# Patient Record
Sex: MALE | Race: WHITE | Employment: OTHER | URBAN - METROPOLITAN AREA
[De-identification: names, ages, dates, MRNs, and addresses within clinical notes are randomized per-mention and may not be internally consistent; named-entity substitution may affect disease eponyms.]

---

## 2017-03-24 RX ORDER — ESCITALOPRAM OXALATE 5 MG/1
10 TABLET ORAL DAILY
COMMUNITY
End: 2017-06-08 | Stop reason: DRUGHIGH

## 2017-03-24 RX ORDER — LORAZEPAM 0.5 MG/1
0.5 TABLET ORAL EVERY 4 HOURS PRN
COMMUNITY

## 2017-03-25 ENCOUNTER — HOSPITAL ENCOUNTER (OUTPATIENT)
Facility: HOSPITAL | Age: 57
Discharge: HOME | End: 2017-03-27
Attending: SURGERY | Admitting: SURGERY
Payer: MEDICARE

## 2017-03-25 ENCOUNTER — ANESTHESIA (OUTPATIENT)
Dept: SURGERY | Facility: HOSPITAL | Age: 57
End: 2017-03-25
Payer: MEDICARE

## 2017-03-25 ENCOUNTER — SURGERY (OUTPATIENT)
Age: 57
End: 2017-03-25

## 2017-03-25 ENCOUNTER — ANESTHESIA EVENT (OUTPATIENT)
Dept: SURGERY | Facility: HOSPITAL | Age: 57
End: 2017-03-25
Payer: MEDICARE

## 2017-03-25 DIAGNOSIS — T82.858A AV FISTULA STENOSIS, INITIAL ENCOUNTER (HCC): ICD-10-CM

## 2017-03-25 DIAGNOSIS — T82.898A HEMODIALYSIS AV FISTULA ANEURYSM, INITIAL ENCOUNTER (HCC): Primary | ICD-10-CM

## 2017-03-25 LAB
ANION GAP SERPL CALC-SCNC: 12 MMOL/L (ref 0–18)
ANTIBODY SCREEN: NEGATIVE
BASOPHILS # BLD: 0 K/UL (ref 0–0.2)
BASOPHILS NFR BLD: 0 %
BUN SERPL-MCNC: 45 MG/DL (ref 8–20)
BUN/CREAT SERPL: 7.4 (ref 10–20)
CALCIUM SERPL-MCNC: 9.2 MG/DL (ref 8.5–10.5)
CHLORIDE SERPL-SCNC: 94 MMOL/L (ref 95–110)
CO2 SERPL-SCNC: 27 MMOL/L (ref 22–32)
CREAT SERPL-MCNC: 6.06 MG/DL (ref 0.5–1.5)
EOSINOPHIL # BLD: 0 K/UL (ref 0–0.7)
EOSINOPHIL NFR BLD: 0 %
ERYTHROCYTE [DISTWIDTH] IN BLOOD BY AUTOMATED COUNT: 17.2 % (ref 11–15)
GLUCOSE BLDC GLUCOMTR-MCNC: 106 MG/DL (ref 70–99)
GLUCOSE BLDC GLUCOMTR-MCNC: 153 MG/DL (ref 70–99)
GLUCOSE BLDC GLUCOMTR-MCNC: 172 MG/DL (ref 70–99)
GLUCOSE BLDC GLUCOMTR-MCNC: 227 MG/DL (ref 70–99)
GLUCOSE SERPL-MCNC: 253 MG/DL (ref 70–99)
HCT VFR BLD AUTO: 30.3 % (ref 41–52)
HGB BLD-MCNC: 10.2 G/DL (ref 13.5–17.5)
LYMPHOCYTES # BLD: 0.5 K/UL (ref 1–4)
LYMPHOCYTES NFR BLD: 5 %
MCH RBC QN AUTO: 31.9 PG (ref 27–32)
MCHC RBC AUTO-ENTMCNC: 33.6 G/DL (ref 32–37)
MCV RBC AUTO: 94.8 FL (ref 80–100)
MONOCYTES # BLD: 0.2 K/UL (ref 0–1)
MONOCYTES NFR BLD: 2 %
NEUTROPHILS # BLD AUTO: 8.3 K/UL (ref 1.8–7.7)
NEUTROPHILS NFR BLD: 92 %
OSMOLALITY UR CALC.SUM OF ELEC: 296 MOSM/KG (ref 275–295)
PLATELET # BLD AUTO: 149 K/UL (ref 140–400)
PMV BLD AUTO: 7.8 FL (ref 7.4–10.3)
POTASSIUM SERPL-SCNC: 4.4 MMOL/L (ref 3.3–5.1)
POTASSIUM SERPL-SCNC: 5.6 MMOL/L (ref 3.3–5.1)
RBC # BLD AUTO: 3.2 M/UL (ref 4.5–5.9)
RH BLOOD TYPE: POSITIVE
SODIUM SERPL-SCNC: 133 MMOL/L (ref 136–144)
WBC # BLD AUTO: 9 K/UL (ref 4–11)

## 2017-03-25 PROCEDURE — 051F09Y BYPASS LEFT CEPHALIC VEIN TO UPPER VEIN WITH AUTOLOGOUS VENOUS TISSUE, OPEN APPROACH: ICD-10-PCS | Performed by: SURGERY

## 2017-03-25 PROCEDURE — 99219 INITIAL OBSERVATION CARE,LEVL II: CPT | Performed by: HOSPITALIST

## 2017-03-25 PROCEDURE — 05QF0ZZ REPAIR LEFT CEPHALIC VEIN, OPEN APPROACH: ICD-10-PCS | Performed by: SURGERY

## 2017-03-25 RX ORDER — NALOXONE HYDROCHLORIDE 0.4 MG/ML
80 INJECTION, SOLUTION INTRAMUSCULAR; INTRAVENOUS; SUBCUTANEOUS AS NEEDED
Status: DISCONTINUED | OUTPATIENT
Start: 2017-03-25 | End: 2017-03-25 | Stop reason: HOSPADM

## 2017-03-25 RX ORDER — DEXAMETHASONE SODIUM PHOSPHATE 4 MG/ML
VIAL (ML) INJECTION AS NEEDED
Status: DISCONTINUED | OUTPATIENT
Start: 2017-03-25 | End: 2017-03-25 | Stop reason: SURG

## 2017-03-25 RX ORDER — MORPHINE SULFATE 2 MG/ML
2 INJECTION, SOLUTION INTRAMUSCULAR; INTRAVENOUS EVERY 2 HOUR PRN
Status: DISCONTINUED | OUTPATIENT
Start: 2017-03-25 | End: 2017-03-27

## 2017-03-25 RX ORDER — HYDROMORPHONE HYDROCHLORIDE 1 MG/ML
0.2 INJECTION, SOLUTION INTRAMUSCULAR; INTRAVENOUS; SUBCUTANEOUS EVERY 5 MIN PRN
Status: DISCONTINUED | OUTPATIENT
Start: 2017-03-25 | End: 2017-03-25 | Stop reason: HOSPADM

## 2017-03-25 RX ORDER — ONDANSETRON 2 MG/ML
4 INJECTION INTRAMUSCULAR; INTRAVENOUS EVERY 6 HOURS PRN
Status: DISCONTINUED | OUTPATIENT
Start: 2017-03-25 | End: 2017-03-27

## 2017-03-25 RX ORDER — LORAZEPAM 0.5 MG/1
0.5 TABLET ORAL EVERY 4 HOURS PRN
Status: DISCONTINUED | OUTPATIENT
Start: 2017-03-25 | End: 2017-03-27

## 2017-03-25 RX ORDER — SODIUM PHOSPHATE, DIBASIC AND SODIUM PHOSPHATE, MONOBASIC 7; 19 G/133ML; G/133ML
1 ENEMA RECTAL ONCE AS NEEDED
Status: ACTIVE | OUTPATIENT
Start: 2017-03-25 | End: 2017-03-25

## 2017-03-25 RX ORDER — MORPHINE SULFATE 2 MG/ML
1 INJECTION, SOLUTION INTRAMUSCULAR; INTRAVENOUS EVERY 2 HOUR PRN
Status: DISCONTINUED | OUTPATIENT
Start: 2017-03-25 | End: 2017-03-27

## 2017-03-25 RX ORDER — SODIUM CHLORIDE 9 MG/ML
INJECTION, SOLUTION INTRAVENOUS CONTINUOUS PRN
Status: DISCONTINUED | OUTPATIENT
Start: 2017-03-25 | End: 2017-03-25 | Stop reason: SURG

## 2017-03-25 RX ORDER — METOPROLOL SUCCINATE 25 MG/1
25 TABLET, EXTENDED RELEASE ORAL 2 TIMES DAILY
Status: DISCONTINUED | OUTPATIENT
Start: 2017-03-25 | End: 2017-03-27

## 2017-03-25 RX ORDER — ATORVASTATIN CALCIUM 10 MG/1
10 TABLET, FILM COATED ORAL NIGHTLY
Status: DISCONTINUED | OUTPATIENT
Start: 2017-03-25 | End: 2017-03-27

## 2017-03-25 RX ORDER — MORPHINE SULFATE 4 MG/ML
4 INJECTION, SOLUTION INTRAMUSCULAR; INTRAVENOUS EVERY 2 HOUR PRN
Status: DISCONTINUED | OUTPATIENT
Start: 2017-03-25 | End: 2017-03-27

## 2017-03-25 RX ORDER — TEMAZEPAM 30 MG/1
30 CAPSULE ORAL NIGHTLY
Status: DISCONTINUED | OUTPATIENT
Start: 2017-03-25 | End: 2017-03-27

## 2017-03-25 RX ORDER — DEXTROSE MONOHYDRATE 25 G/50ML
50 INJECTION, SOLUTION INTRAVENOUS AS NEEDED
Status: DISCONTINUED | OUTPATIENT
Start: 2017-03-25 | End: 2017-03-27

## 2017-03-25 RX ORDER — ONDANSETRON 2 MG/ML
4 INJECTION INTRAMUSCULAR; INTRAVENOUS ONCE AS NEEDED
Status: DISCONTINUED | OUTPATIENT
Start: 2017-03-25 | End: 2017-03-25 | Stop reason: HOSPADM

## 2017-03-25 RX ORDER — ASPIRIN 81 MG/1
81 TABLET ORAL DAILY
Status: DISCONTINUED | OUTPATIENT
Start: 2017-03-26 | End: 2017-03-27

## 2017-03-25 RX ORDER — HYDROCODONE BITARTRATE AND ACETAMINOPHEN 10; 325 MG/1; MG/1
1 TABLET ORAL EVERY 4 HOURS PRN
Status: DISCONTINUED | OUTPATIENT
Start: 2017-03-25 | End: 2017-03-27

## 2017-03-25 RX ORDER — POLYETHYLENE GLYCOL 3350 17 G/17G
17 POWDER, FOR SOLUTION ORAL DAILY PRN
Status: DISCONTINUED | OUTPATIENT
Start: 2017-03-25 | End: 2017-03-27

## 2017-03-25 RX ORDER — SODIUM CHLORIDE, SODIUM LACTATE, POTASSIUM CHLORIDE, CALCIUM CHLORIDE 600; 310; 30; 20 MG/100ML; MG/100ML; MG/100ML; MG/100ML
INJECTION, SOLUTION INTRAVENOUS CONTINUOUS
Status: DISCONTINUED | OUTPATIENT
Start: 2017-03-25 | End: 2017-03-25

## 2017-03-25 RX ORDER — ACETAMINOPHEN 325 MG/1
650 TABLET ORAL ONCE
Status: DISCONTINUED | OUTPATIENT
Start: 2017-03-25 | End: 2017-03-25 | Stop reason: HOSPADM

## 2017-03-25 RX ORDER — MORPHINE SULFATE 10 MG/ML
6 INJECTION, SOLUTION INTRAMUSCULAR; INTRAVENOUS EVERY 10 MIN PRN
Status: DISCONTINUED | OUTPATIENT
Start: 2017-03-25 | End: 2017-03-25 | Stop reason: HOSPADM

## 2017-03-25 RX ORDER — ZOLPIDEM TARTRATE 5 MG/1
5 TABLET ORAL NIGHTLY PRN
Status: DISCONTINUED | OUTPATIENT
Start: 2017-03-25 | End: 2017-03-27

## 2017-03-25 RX ORDER — HYDROCODONE BITARTRATE AND ACETAMINOPHEN 5; 325 MG/1; MG/1
2 TABLET ORAL AS NEEDED
Status: DISCONTINUED | OUTPATIENT
Start: 2017-03-25 | End: 2017-03-25 | Stop reason: HOSPADM

## 2017-03-25 RX ORDER — DOCUSATE SODIUM 100 MG/1
100 CAPSULE, LIQUID FILLED ORAL 2 TIMES DAILY
Status: DISCONTINUED | OUTPATIENT
Start: 2017-03-25 | End: 2017-03-27

## 2017-03-25 RX ORDER — HYDROCODONE BITARTRATE AND ACETAMINOPHEN 10; 325 MG/1; MG/1
1 TABLET ORAL EVERY 6 HOURS PRN
Qty: 20 TABLET | Refills: 0 | Status: SHIPPED | OUTPATIENT
Start: 2017-03-25 | End: 2017-03-30

## 2017-03-25 RX ORDER — MORPHINE SULFATE 2 MG/ML
2 INJECTION, SOLUTION INTRAMUSCULAR; INTRAVENOUS EVERY 10 MIN PRN
Status: DISCONTINUED | OUTPATIENT
Start: 2017-03-25 | End: 2017-03-25 | Stop reason: HOSPADM

## 2017-03-25 RX ORDER — ESCITALOPRAM OXALATE 10 MG/1
5 TABLET ORAL DAILY
Status: DISCONTINUED | OUTPATIENT
Start: 2017-03-26 | End: 2017-03-27

## 2017-03-25 RX ORDER — 0.9 % SODIUM CHLORIDE 0.9 %
VIAL (ML) INJECTION
Status: COMPLETED
Start: 2017-03-25 | End: 2017-03-25

## 2017-03-25 RX ORDER — ONDANSETRON 2 MG/ML
INJECTION INTRAMUSCULAR; INTRAVENOUS AS NEEDED
Status: DISCONTINUED | OUTPATIENT
Start: 2017-03-25 | End: 2017-03-25 | Stop reason: SURG

## 2017-03-25 RX ORDER — BUPIVACAINE HYDROCHLORIDE 2.5 MG/ML
INJECTION, SOLUTION EPIDURAL; INFILTRATION; INTRACAUDAL AS NEEDED
Status: DISCONTINUED | OUTPATIENT
Start: 2017-03-25 | End: 2017-03-25 | Stop reason: HOSPADM

## 2017-03-25 RX ORDER — HYDROMORPHONE HYDROCHLORIDE 1 MG/ML
0.4 INJECTION, SOLUTION INTRAMUSCULAR; INTRAVENOUS; SUBCUTANEOUS EVERY 5 MIN PRN
Status: DISCONTINUED | OUTPATIENT
Start: 2017-03-25 | End: 2017-03-25 | Stop reason: HOSPADM

## 2017-03-25 RX ORDER — ACETAMINOPHEN 325 MG/1
650 TABLET ORAL EVERY 6 HOURS PRN
Status: DISCONTINUED | OUTPATIENT
Start: 2017-03-25 | End: 2017-03-27

## 2017-03-25 RX ORDER — LIDOCAINE HYDROCHLORIDE 10 MG/ML
INJECTION, SOLUTION EPIDURAL; INFILTRATION; INTRACAUDAL; PERINEURAL AS NEEDED
Status: DISCONTINUED | OUTPATIENT
Start: 2017-03-25 | End: 2017-03-25 | Stop reason: SURG

## 2017-03-25 RX ORDER — FAMOTIDINE 20 MG/1
20 TABLET ORAL ONCE
Status: COMPLETED | OUTPATIENT
Start: 2017-03-25 | End: 2017-03-25

## 2017-03-25 RX ORDER — HEPARIN SODIUM 1000 [USP'U]/ML
INJECTION, SOLUTION INTRAVENOUS; SUBCUTANEOUS AS NEEDED
Status: DISCONTINUED | OUTPATIENT
Start: 2017-03-25 | End: 2017-03-25 | Stop reason: SURG

## 2017-03-25 RX ORDER — HYDROCODONE BITARTRATE AND ACETAMINOPHEN 5; 325 MG/1; MG/1
1 TABLET ORAL AS NEEDED
Status: DISCONTINUED | OUTPATIENT
Start: 2017-03-25 | End: 2017-03-25 | Stop reason: HOSPADM

## 2017-03-25 RX ORDER — MORPHINE SULFATE 4 MG/ML
4 INJECTION, SOLUTION INTRAMUSCULAR; INTRAVENOUS EVERY 10 MIN PRN
Status: DISCONTINUED | OUTPATIENT
Start: 2017-03-25 | End: 2017-03-25 | Stop reason: HOSPADM

## 2017-03-25 RX ORDER — HEPARIN SODIUM 5000 [USP'U]/ML
5000 INJECTION, SOLUTION INTRAVENOUS; SUBCUTANEOUS EVERY 12 HOURS
Status: DISCONTINUED | OUTPATIENT
Start: 2017-03-26 | End: 2017-03-27

## 2017-03-25 RX ORDER — EPHEDRINE SULFATE 50 MG/ML
INJECTION, SOLUTION INTRAVENOUS AS NEEDED
Status: DISCONTINUED | OUTPATIENT
Start: 2017-03-25 | End: 2017-03-25 | Stop reason: SURG

## 2017-03-25 RX ORDER — BISACODYL 10 MG
10 SUPPOSITORY, RECTAL RECTAL
Status: DISCONTINUED | OUTPATIENT
Start: 2017-03-25 | End: 2017-03-27

## 2017-03-25 RX ORDER — SEVELAMER CARBONATE 800 MG/1
800 TABLET, FILM COATED ORAL
Status: DISCONTINUED | OUTPATIENT
Start: 2017-03-25 | End: 2017-03-27

## 2017-03-25 RX ORDER — HYDROMORPHONE HYDROCHLORIDE 1 MG/ML
0.6 INJECTION, SOLUTION INTRAMUSCULAR; INTRAVENOUS; SUBCUTANEOUS EVERY 5 MIN PRN
Status: DISCONTINUED | OUTPATIENT
Start: 2017-03-25 | End: 2017-03-25 | Stop reason: HOSPADM

## 2017-03-25 RX ADMIN — ONDANSETRON 4 MG: 2 INJECTION INTRAMUSCULAR; INTRAVENOUS at 12:27:00

## 2017-03-25 RX ADMIN — LIDOCAINE HYDROCHLORIDE 25 MG: 10 INJECTION, SOLUTION EPIDURAL; INFILTRATION; INTRACAUDAL; PERINEURAL at 12:13:00

## 2017-03-25 RX ADMIN — EPHEDRINE SULFATE 5 MG: 50 INJECTION, SOLUTION INTRAVENOUS at 12:27:00

## 2017-03-25 RX ADMIN — SODIUM CHLORIDE: 9 INJECTION, SOLUTION INTRAVENOUS at 12:40:00

## 2017-03-25 RX ADMIN — SODIUM CHLORIDE: 9 INJECTION, SOLUTION INTRAVENOUS at 14:40:00

## 2017-03-25 RX ADMIN — SODIUM CHLORIDE: 9 INJECTION, SOLUTION INTRAVENOUS at 12:07:00

## 2017-03-25 RX ADMIN — SODIUM CHLORIDE: 9 INJECTION, SOLUTION INTRAVENOUS at 15:05:00

## 2017-03-25 RX ADMIN — HEPARIN SODIUM 3000 UNITS: 1000 INJECTION, SOLUTION INTRAVENOUS; SUBCUTANEOUS at 12:55:00

## 2017-03-25 RX ADMIN — EPHEDRINE SULFATE 5 MG: 50 INJECTION, SOLUTION INTRAVENOUS at 12:32:00

## 2017-03-25 RX ADMIN — HEPARIN SODIUM 1000 UNITS: 1000 INJECTION, SOLUTION INTRAVENOUS; SUBCUTANEOUS at 14:12:00

## 2017-03-25 RX ADMIN — EPHEDRINE SULFATE 5 MG: 50 INJECTION, SOLUTION INTRAVENOUS at 12:31:00

## 2017-03-25 RX ADMIN — DEXAMETHASONE SODIUM PHOSPHATE 4 MG: 4 MG/ML VIAL (ML) INJECTION at 12:27:00

## 2017-03-25 RX ADMIN — EPHEDRINE SULFATE 10 MG: 50 INJECTION, SOLUTION INTRAVENOUS at 12:21:00

## 2017-03-25 NOTE — ANESTHESIA POSTPROCEDURE EVALUATION
Patient: Bella Nichols    Procedure Summary     Date Anesthesia Start Anesthesia Stop Room / Location    03/25/17 1208 1526 300 [facility] MAIN OR 02 / 300 [facility] MAIN OR       Procedure Diagnosis Surgeon Responsible Provider    A.V. FISTULA (Left ) (End stage renal di

## 2017-03-25 NOTE — BRIEF OP NOTE
[address] UNIT  Brief Op Note       Patients Name: Percy Smith  Attending Physician: Lv Clemons MD  Operating Physician: Leeann Manriquez MD  CSN: [id number]     Location:  OR  MRN: [id number]    YOB: 1960

## 2017-03-25 NOTE — ANESTHESIA PREPROCEDURE EVALUATION
Anesthesia PreOp Note    HPI:     Sctot Leary is a 60year old male who presents for preoperative consultation requested by: Marcus Santos MD    Date of Surgery: 3/25/2017    Procedure(s):  A.V. FISTULA  Indication: End stage renal disease with com per day, may take three times on dialysis days Disp:  Rfl:  3/24/2017 at 1000   RENVELA 800 MG Oral Tab Take 800 mg by mouth 4 (four) times daily with meals and nightly.    Disp:  Rfl:  3/24/2017 at 1900   Metoprolol Succinate ER 25 MG Oral Tablet 24 Hr Regulo Current Outpatient Prescriptions Ordered in Epic:  HYDROcodone-acetaminophen (NORCO)  MG Oral Tab Take 1 tablet by mouth every 6 (six) hours as needed for Pain.  Disp: 20 tablet Rfl: 0       No Known Allergies    Family History   Problem Relation Devin Del Rosario  of the nature of the anesthetic plan, benefits, risks, major complications, and any alternative forms of anesthetic management. All of the patient's questions were answered to the best of my ability.  The patient desires the anesthetic

## 2017-03-26 LAB
ANION GAP SERPL CALC-SCNC: 13 MMOL/L (ref 0–18)
BUN SERPL-MCNC: 57 MG/DL (ref 8–20)
BUN/CREAT SERPL: 8.4 (ref 10–20)
CALCIUM SERPL-MCNC: 9.2 MG/DL (ref 8.5–10.5)
CHLORIDE SERPL-SCNC: 93 MMOL/L (ref 95–110)
CO2 SERPL-SCNC: 26 MMOL/L (ref 22–32)
CREAT SERPL-MCNC: 6.77 MG/DL (ref 0.5–1.5)
GLUCOSE BLDC GLUCOMTR-MCNC: 152 MG/DL (ref 70–99)
GLUCOSE BLDC GLUCOMTR-MCNC: 159 MG/DL (ref 70–99)
GLUCOSE BLDC GLUCOMTR-MCNC: 168 MG/DL (ref 70–99)
GLUCOSE BLDC GLUCOMTR-MCNC: 208 MG/DL (ref 70–99)
GLUCOSE SERPL-MCNC: 165 MG/DL (ref 70–99)
HBA1C MFR BLD: 6.6 % (ref 4–6)
MAGNESIUM SERPL-MCNC: 2.6 MG/DL (ref 1.8–2.5)
OSMOLALITY UR CALC.SUM OF ELEC: 294 MOSM/KG (ref 275–295)
POTASSIUM SERPL-SCNC: 5.4 MMOL/L (ref 3.3–5.1)
SODIUM SERPL-SCNC: 132 MMOL/L (ref 136–144)

## 2017-03-26 PROCEDURE — 99226 SUBSEQUENT OBSERVATION CARE: CPT | Performed by: HOSPITALIST

## 2017-03-26 RX ORDER — HEPARIN SODIUM 5000 [USP'U]/ML
5000 INJECTION, SOLUTION INTRAVENOUS; SUBCUTANEOUS EVERY 12 HOURS SCHEDULED
Status: DISCONTINUED | OUTPATIENT
Start: 2017-03-26 | End: 2017-03-27

## 2017-03-26 RX ORDER — 0.9 % SODIUM CHLORIDE 0.9 %
VIAL (ML) INJECTION
Status: COMPLETED
Start: 2017-03-26 | End: 2017-03-26

## 2017-03-26 NOTE — PROGRESS NOTES
[facility]    Progress Note    Frutoso Gadaniela Patient Status:  Outpatient in a Bed    1960 MRN [id number]   Location [facility]5W Attending Wilder Saleem MD   Hosp Day # 1 PCP Jerrell Huntley       Subjective:   Complain    4.      Coronary artery disease, status post multiple stents. 5.      Peripheral vascular disease, status post multiple amputations.   6.      Heart arrhythmias.  The patient was hospitalized last year, August 2016, seen by Dr. Tressa Essex for narrow-comple

## 2017-03-26 NOTE — H&P
[facility]    PATIENT'S NAME: Mahsa Lamar   ATTENDING PHYSICIAN: Guadalupe Carbajal MD   PATIENT ACCOUNT#:   [id number]    LOCATION:  [place] [address] RECORD #:   [id number]       YOB: 1960  ADMISSION DATE:       03/2 Had a normal bowel movement yesterday. Others systems reviewed, and negative except as above. PHYSICAL EXAMINATION:    GENERAL:  The patient is very pleasant, cooperative. He has wife at bedside.   Not acute distress, a little uncomfortable due to p brought to sinus rhythm; sustained. The patient reports no problems since then. 9.   Continue home medications. Monitor on telemetry closely. Recheck electrolytes. 10.   Chronic insomnia.   The patient says that he takes both Restoril and Ambien every

## 2017-03-26 NOTE — OPERATIVE REPORT
[facility]    PATIENT'S NAME: Ish Abdi   ATTENDING PHYSICIAN: Inga Guevara MD   OPERATING PHYSICIAN: Teddy Calles MD   PATIENT ACCOUNT#:   [id number]    LOCATION:  [address] [place] [address] Road RECORD #:   [id number]       DATE OF BIR brought to the operating room, and placed in the supine position. General anesthesia was given. The patient's entire left upper extremity, axilla, and chest were prepped and draped in the usual sterile manner. A time-out was done.   An incision was made the AV fistula aneurysm with standard technique and 6-0 Prolene suture.   It should be noted that when I had seen the patient in the office I discussed with her that there may be a venous outflow stenosis at the mid left upper arm; it should be noted I coul

## 2017-03-27 VITALS
TEMPERATURE: 98 F | OXYGEN SATURATION: 100 % | RESPIRATION RATE: 18 BRPM | DIASTOLIC BLOOD PRESSURE: 44 MMHG | BODY MASS INDEX: 29.94 KG/M2 | HEIGHT: 68 IN | WEIGHT: 197.56 LBS | HEART RATE: 65 BPM | SYSTOLIC BLOOD PRESSURE: 98 MMHG

## 2017-03-27 LAB
ALBUMIN SERPL BCP-MCNC: 3.1 G/DL (ref 3.5–4.8)
ANION GAP SERPL CALC-SCNC: 13 MMOL/L (ref 0–18)
BASOPHILS # BLD: 0 K/UL (ref 0–0.2)
BASOPHILS NFR BLD: 1 %
BUN SERPL-MCNC: 76 MG/DL (ref 8–20)
BUN/CREAT SERPL: 8.9 (ref 10–20)
CALCIUM SERPL-MCNC: 9.2 MG/DL (ref 8.5–10.5)
CHLORIDE SERPL-SCNC: 92 MMOL/L (ref 95–110)
CO2 SERPL-SCNC: 29 MMOL/L (ref 22–32)
CREAT SERPL-MCNC: 8.52 MG/DL (ref 0.5–1.5)
EOSINOPHIL # BLD: 0.1 K/UL (ref 0–0.7)
EOSINOPHIL NFR BLD: 2 %
ERYTHROCYTE [DISTWIDTH] IN BLOOD BY AUTOMATED COUNT: 17.7 % (ref 11–15)
GLUCOSE BLDC GLUCOMTR-MCNC: 100 MG/DL (ref 70–99)
GLUCOSE SERPL-MCNC: 129 MG/DL (ref 70–99)
HCT VFR BLD AUTO: 27 % (ref 41–52)
HGB BLD-MCNC: 9 G/DL (ref 13.5–17.5)
HGB BLD-MCNC: 9.3 G/DL (ref 13.5–17.5)
LYMPHOCYTES # BLD: 1.2 K/UL (ref 1–4)
LYMPHOCYTES NFR BLD: 17 %
MCH RBC QN AUTO: 31.7 PG (ref 27–32)
MCHC RBC AUTO-ENTMCNC: 33.2 G/DL (ref 32–37)
MCV RBC AUTO: 95.3 FL (ref 80–100)
MONOCYTES # BLD: 0.9 K/UL (ref 0–1)
MONOCYTES NFR BLD: 12 %
NEUTROPHILS # BLD AUTO: 4.8 K/UL (ref 1.8–7.7)
NEUTROPHILS NFR BLD: 69 %
OSMOLALITY UR CALC.SUM OF ELEC: 302 MOSM/KG (ref 275–295)
PHOSPHATE SERPL-MCNC: 7.2 MG/DL (ref 2.4–4.7)
PLATELET # BLD AUTO: 131 K/UL (ref 140–400)
PMV BLD AUTO: 8.7 FL (ref 7.4–10.3)
POTASSIUM SERPL-SCNC: 4.9 MMOL/L (ref 3.3–5.1)
RBC # BLD AUTO: 2.83 M/UL (ref 4.5–5.9)
SODIUM SERPL-SCNC: 134 MMOL/L (ref 136–144)
WBC # BLD AUTO: 7 K/UL (ref 4–11)

## 2017-03-27 PROCEDURE — 99217 OBSERVATION CARE DISCHARGE: CPT | Performed by: HOSPITALIST

## 2017-03-27 NOTE — PROGRESS NOTES
Discussed discharge plans with patient at bedside. Pt to be d/c today and go to HD in outpatient clinic. Discussed discharge meds, follow up, and instructions. Including dressing and wound care instructions. All questions answered.   Patient d/c home

## 2017-03-27 NOTE — DISCHARGE PLANNING
[facility] following up on d/c planning for the patient. Per report he will be d/c'd today and will go directly to his dialysis clinic. [facility] called [address] to update them. He did miss his chair time today, but they will be able to fit him in for dialysis today.

## 2017-03-27 NOTE — PLAN OF CARE
Patient/Family Goals    • Patient/Family Long Term Goal Adequate for Discharge    • Patient/Family Short Term Goal Adequate for Discharge        Discharged directly to outpatient dialysis center with own wheelchair and special braces and shoes.   Pt applied

## 2017-03-29 ENCOUNTER — TELEPHONE (OUTPATIENT)
Dept: CARDIOLOGY UNIT | Facility: HOSPITAL | Age: 57
End: 2017-03-29

## 2017-03-29 NOTE — DISCHARGE SUMMARY
[facility] HOSPITALIST  DISCHARGE SUMMARY     Cassie Calvo Patient Status:  Outpatient in a Bed    1960 MRN [id number]   Location [facility] Attending No att. providers found   [address] Day # 2 PCP Prem Benitez     Date of Admission: 3/25/201 disease, difficult access.  The patient underwent left arm AV fistula revision by Dr. Kristin Tao on 3/25  Admitted for monitoring of his arrhythmia, pain control  -Pain control.  We will use IV morphine p.r.n. in addition to regular Norco 10 that the patient phan tablet by mouth every 6 (six) hours as needed for Pain.     Stop taking on:  3/30/2017   Quantity:  20 tablet   Refills:  0       HYDROcodone-acetaminophen  MG Tabs   Last time this was given:  1 tablet on 3/27/2017 12:02 PM   Commonly known as:  [employer] Bisulfate        Take 75 mg by mouth daily. Refills:  0       RENVELA 800 MG Tabs   Last time this was given:  800 mg on 3/27/2017  8:32 AM   Generic drug:  Sevelamer Carbonate        Take 800 mg by mouth 4 (four) times daily with meals and nightly. for discharge care transition.     Lace+ Score: 5  59-90 High Risk  29-58 Medium Risk  0-28 Low Risk    Risk of readmission: Steven Suazo has Low Risk of readmission after discharge from the hospital.        Karli Rahman MD 3/29/2017    Time sp

## 2017-06-06 ENCOUNTER — TELEPHONE (OUTPATIENT)
Dept: SURGERY | Facility: CLINIC | Age: 57
End: 2017-06-06

## 2017-06-06 NOTE — TELEPHONE ENCOUNTER
Returned pt's wife phone call. She said she had received message from our office that her  has an appointment with our office for 6/8/17 at 1100. She is concerned his L hand has an infection again. States it is swollen, no erythema or drainage.

## 2017-06-06 NOTE — TELEPHONE ENCOUNTER
Pt’s wife called to request to speak to a nurse regarding her ’s swollen L hand. I have booked pt appt for 6/8/17@ 11am. Please call to advice wife what to do till Thursday’s appt. Thank you.

## 2017-06-08 ENCOUNTER — OFFICE VISIT (OUTPATIENT)
Dept: SURGERY | Facility: CLINIC | Age: 57
End: 2017-06-08

## 2017-06-08 DIAGNOSIS — S61.002A OPEN WOUND OF LEFT THUMB, INITIAL ENCOUNTER: Primary | ICD-10-CM

## 2017-06-08 PROCEDURE — 99214 OFFICE O/P EST MOD 30 MIN: CPT | Performed by: PLASTIC SURGERY

## 2017-06-08 PROCEDURE — G0463 HOSPITAL OUTPT CLINIC VISIT: HCPCS | Performed by: PLASTIC SURGERY

## 2017-06-08 RX ORDER — CEFADROXIL 500 MG/1
500 CAPSULE ORAL 2 TIMES DAILY
Qty: 14 CAPSULE | Refills: 0 | Status: SHIPPED | OUTPATIENT
Start: 2017-06-08 | End: 2017-06-15

## 2017-06-08 RX ORDER — ESCITALOPRAM OXALATE 10 MG/1
10 TABLET ORAL DAILY
COMMUNITY
Start: 2017-06-06 | End: 2018-02-08

## 2017-06-08 NOTE — PROGRESS NOTES
Ariel Singer is a 60year old male that presents with Patient presents with:  Wound: Multiple open wounds to bilateral hands and fingers, pt is a , hx finger amputations.   Sees psych for behavioral therapy, currently washing with soap and water RENVELA 800 MG Oral Tab Take 800 mg by mouth 4 (four) times daily with meals and nightly. Disp:  Rfl:    Metoprolol Succinate ER 25 MG Oral Tablet 24 Hr Take 25 mg by mouth 2 (two) times daily.  Disp:  Rfl:    insulin glargine (LANTUS) 100 UNIT/ML Subcu FOOT,TRANSMETATARSAL Bilateral     CATH BARE METAL STENT (BMS)      Comment x2    AMPUTATION FINGER/THUMB Left     Comment thumb, 1st, and 2nd digits    AMPUTATION FINGER/THUMB Right     Comment index finger    CATARACT Bilateral     Comment at age 37

## 2017-06-21 ENCOUNTER — TELEPHONE (OUTPATIENT)
Dept: SURGERY | Facility: CLINIC | Age: 57
End: 2017-06-21

## 2017-06-21 NOTE — TELEPHONE ENCOUNTER
Pt's spouse called to cancel pt's appt for tomorrow 60/22/17 (FU RH INFECTION). Per spouse pt is feeling much better and appt is no longer needed. Thank You.

## 2017-07-20 RX ORDER — CEFADROXIL 500 MG/1
CAPSULE ORAL
Qty: 14 CAPSULE | Refills: 0 | OUTPATIENT
Start: 2017-07-20

## 2017-07-24 ENCOUNTER — APPOINTMENT (OUTPATIENT)
Dept: GENERAL RADIOLOGY | Facility: HOSPITAL | Age: 57
DRG: 291 | End: 2017-07-24
Attending: EMERGENCY MEDICINE
Payer: MEDICARE

## 2017-07-24 ENCOUNTER — HOSPITAL ENCOUNTER (INPATIENT)
Facility: HOSPITAL | Age: 57
LOS: 4 days | Discharge: HOME | DRG: 291 | End: 2017-07-29
Attending: EMERGENCY MEDICINE | Admitting: INTERNAL MEDICINE
Payer: MEDICARE

## 2017-07-24 ENCOUNTER — APPOINTMENT (OUTPATIENT)
Dept: CT IMAGING | Facility: HOSPITAL | Age: 57
DRG: 291 | End: 2017-07-24
Attending: EMERGENCY MEDICINE
Payer: MEDICARE

## 2017-07-24 DIAGNOSIS — R77.8 ELEVATED TROPONIN: ICD-10-CM

## 2017-07-24 DIAGNOSIS — S00.93XA CONTUSION OF HEAD, UNSPECIFIED PART OF HEAD, INITIAL ENCOUNTER: ICD-10-CM

## 2017-07-24 DIAGNOSIS — I50.9 ACUTE ON CHRONIC CONGESTIVE HEART FAILURE, UNSPECIFIED CONGESTIVE HEART FAILURE TYPE: ICD-10-CM

## 2017-07-24 DIAGNOSIS — R29.6 RECURRENT FALLS: Primary | ICD-10-CM

## 2017-07-24 LAB
ALBUMIN SERPL BCP-MCNC: 2.5 G/DL (ref 3.5–4.8)
ALBUMIN/GLOB SERPL: 0.6 {RATIO} (ref 1–2)
ALP SERPL-CCNC: 95 U/L (ref 32–100)
ALT SERPL-CCNC: 15 U/L (ref 17–63)
ANION GAP SERPL CALC-SCNC: 9 MMOL/L (ref 0–18)
AST SERPL-CCNC: 26 U/L (ref 15–41)
BASOPHILS # BLD: 0.1 K/UL (ref 0–0.2)
BASOPHILS NFR BLD: 2 %
BILIRUB SERPL-MCNC: 1 MG/DL (ref 0.3–1.2)
BNP SERPL-MCNC: 1225 PG/ML (ref 0–100)
BUN SERPL-MCNC: 17 MG/DL (ref 8–20)
BUN/CREAT SERPL: 4.5 (ref 10–20)
CALCIUM SERPL-MCNC: 9.3 MG/DL (ref 8.5–10.5)
CHLORIDE SERPL-SCNC: 95 MMOL/L (ref 95–110)
CO2 SERPL-SCNC: 33 MMOL/L (ref 22–32)
CREAT SERPL-MCNC: 3.79 MG/DL (ref 0.5–1.5)
EOSINOPHIL # BLD: 0.2 K/UL (ref 0–0.7)
EOSINOPHIL NFR BLD: 2 %
ERYTHROCYTE [DISTWIDTH] IN BLOOD BY AUTOMATED COUNT: 19.8 % (ref 11–15)
GLOBULIN PLAS-MCNC: 4.4 G/DL (ref 2.5–3.7)
GLUCOSE SERPL-MCNC: 137 MG/DL (ref 70–99)
HCT VFR BLD AUTO: 31.9 % (ref 41–52)
HGB BLD-MCNC: 10.1 G/DL (ref 13.5–17.5)
INR BLD: 1.9 (ref 0.9–1.2)
LYMPHOCYTES # BLD: 0.8 K/UL (ref 1–4)
LYMPHOCYTES NFR BLD: 9 %
MCH RBC QN AUTO: 30.1 PG (ref 27–32)
MCHC RBC AUTO-ENTMCNC: 31.6 G/DL (ref 32–37)
MCV RBC AUTO: 95.2 FL (ref 80–100)
MONOCYTES # BLD: 1 K/UL (ref 0–1)
MONOCYTES NFR BLD: 12 %
NEUTROPHILS # BLD AUTO: 6.1 K/UL (ref 1.8–7.7)
NEUTROPHILS NFR BLD: 75 %
OSMOLALITY UR CALC.SUM OF ELEC: 288 MOSM/KG (ref 275–295)
PLATELET # BLD AUTO: 339 K/UL (ref 140–400)
PMV BLD AUTO: 7.9 FL (ref 7.4–10.3)
POTASSIUM SERPL-SCNC: 3.3 MMOL/L (ref 3.3–5.1)
PROT SERPL-MCNC: 6.9 G/DL (ref 5.9–8.4)
PROTHROMBIN TIME: 21.1 SECONDS (ref 11.8–14.5)
RBC # BLD AUTO: 3.35 M/UL (ref 4.5–5.9)
SODIUM SERPL-SCNC: 137 MMOL/L (ref 136–144)
TROPONIN I SERPL-MCNC: 0.04 NG/ML (ref ?–0.03)
WBC # BLD AUTO: 8.2 K/UL (ref 4–11)

## 2017-07-24 PROCEDURE — 70450 CT HEAD/BRAIN W/O DYE: CPT | Performed by: EMERGENCY MEDICINE

## 2017-07-24 PROCEDURE — 71010 XR CHEST AP PORTABLE  (CPT=71010): CPT | Performed by: EMERGENCY MEDICINE

## 2017-07-24 RX ORDER — ASPIRIN 81 MG/1
324 TABLET, CHEWABLE ORAL ONCE
Status: COMPLETED | OUTPATIENT
Start: 2017-07-24 | End: 2017-07-25

## 2017-07-24 RX ORDER — FUROSEMIDE 10 MG/ML
40 INJECTION INTRAMUSCULAR; INTRAVENOUS ONCE
Status: COMPLETED | OUTPATIENT
Start: 2017-07-24 | End: 2017-07-25

## 2017-07-25 ENCOUNTER — APPOINTMENT (OUTPATIENT)
Dept: CV DIAGNOSTICS | Facility: HOSPITAL | Age: 57
DRG: 291 | End: 2017-07-25
Attending: INTERNAL MEDICINE
Payer: MEDICARE

## 2017-07-25 PROBLEM — I50.9 ACUTE ON CHRONIC CONGESTIVE HEART FAILURE, UNSPECIFIED CONGESTIVE HEART FAILURE TYPE: Status: ACTIVE | Noted: 2017-07-25

## 2017-07-25 PROBLEM — R79.89 ELEVATED TROPONIN: Status: ACTIVE | Noted: 2017-07-25

## 2017-07-25 PROBLEM — S00.93XA CONTUSION OF HEAD, UNSPECIFIED PART OF HEAD, INITIAL ENCOUNTER: Status: ACTIVE | Noted: 2017-07-25

## 2017-07-25 PROBLEM — R77.8 ELEVATED TROPONIN: Status: ACTIVE | Noted: 2017-07-25

## 2017-07-25 LAB
ALBUMIN SERPL BCP-MCNC: 2.2 G/DL (ref 3.5–4.8)
ALBUMIN/GLOB SERPL: 0.6 {RATIO} (ref 1–2)
ALP SERPL-CCNC: 80 U/L (ref 32–100)
ALT SERPL-CCNC: 14 U/L (ref 17–63)
ANION GAP SERPL CALC-SCNC: 7 MMOL/L (ref 0–18)
AST SERPL-CCNC: 20 U/L (ref 15–41)
BASOPHILS # BLD: 0.1 K/UL (ref 0–0.2)
BASOPHILS NFR BLD: 1 %
BILIRUB SERPL-MCNC: 1.1 MG/DL (ref 0.3–1.2)
BUN SERPL-MCNC: 17 MG/DL (ref 8–20)
BUN/CREAT SERPL: 4 (ref 10–20)
CALCIUM SERPL-MCNC: 8.8 MG/DL (ref 8.5–10.5)
CHLORIDE SERPL-SCNC: 97 MMOL/L (ref 95–110)
CHOLEST SERPL-MCNC: 53 MG/DL (ref 110–200)
CHOLEST SERPL-MCNC: 62 MG/DL (ref 110–200)
CO2 SERPL-SCNC: 32 MMOL/L (ref 22–32)
CREAT SERPL-MCNC: 4.25 MG/DL (ref 0.5–1.5)
EOSINOPHIL # BLD: 0.3 K/UL (ref 0–0.7)
EOSINOPHIL NFR BLD: 4 %
ERYTHROCYTE [DISTWIDTH] IN BLOOD BY AUTOMATED COUNT: 19.1 % (ref 11–15)
GLOBULIN PLAS-MCNC: 3.8 G/DL (ref 2.5–3.7)
GLUCOSE BLDC GLUCOMTR-MCNC: 116 MG/DL (ref 70–99)
GLUCOSE BLDC GLUCOMTR-MCNC: 133 MG/DL (ref 70–99)
GLUCOSE BLDC GLUCOMTR-MCNC: 146 MG/DL (ref 70–99)
GLUCOSE BLDC GLUCOMTR-MCNC: 86 MG/DL (ref 70–99)
GLUCOSE BLDC GLUCOMTR-MCNC: 97 MG/DL (ref 70–99)
GLUCOSE SERPL-MCNC: 91 MG/DL (ref 70–99)
HBA1C MFR BLD: 5.3 % (ref 4–6)
HCT VFR BLD AUTO: 28.4 % (ref 41–52)
HDLC SERPL-MCNC: 13 MG/DL
HDLC SERPL-MCNC: 16 MG/DL
HGB BLD-MCNC: 9.1 G/DL (ref 13.5–17.5)
INR BLD: 1.9 (ref 0.9–1.2)
LDLC SERPL CALC-MCNC: 26 MG/DL (ref 0–99)
LDLC SERPL CALC-MCNC: 31 MG/DL (ref 0–99)
LYMPHOCYTES # BLD: 0.9 K/UL (ref 1–4)
LYMPHOCYTES NFR BLD: 14 %
MAGNESIUM SERPL-MCNC: 2 MG/DL (ref 1.8–2.5)
MCH RBC QN AUTO: 30.4 PG (ref 27–32)
MCHC RBC AUTO-ENTMCNC: 32.2 G/DL (ref 32–37)
MCV RBC AUTO: 94.4 FL (ref 80–100)
MONOCYTES # BLD: 1 K/UL (ref 0–1)
MONOCYTES NFR BLD: 16 %
NEUTROPHILS # BLD AUTO: 4.1 K/UL (ref 1.8–7.7)
NEUTROPHILS NFR BLD: 65 %
NONHDLC SERPL-MCNC: 40 MG/DL
NONHDLC SERPL-MCNC: 46 MG/DL
OSMOLALITY UR CALC.SUM OF ELEC: 283 MOSM/KG (ref 275–295)
PLATELET # BLD AUTO: 305 K/UL (ref 140–400)
PMV BLD AUTO: 7.3 FL (ref 7.4–10.3)
POTASSIUM SERPL-SCNC: 3.1 MMOL/L (ref 3.3–5.1)
PROT SERPL-MCNC: 6 G/DL (ref 5.9–8.4)
PROTHROMBIN TIME: 21.2 SECONDS (ref 11.8–14.5)
RBC # BLD AUTO: 3 M/UL (ref 4.5–5.9)
SODIUM SERPL-SCNC: 136 MMOL/L (ref 136–144)
TRIGL SERPL-MCNC: 69 MG/DL (ref 1–149)
TRIGL SERPL-MCNC: 75 MG/DL (ref 1–149)
TROPONIN I SERPL-MCNC: 0.04 NG/ML (ref ?–0.03)
TSH SERPL-ACNC: 2.34 UIU/ML (ref 0.45–5.33)
WBC # BLD AUTO: 6.4 K/UL (ref 4–11)

## 2017-07-25 PROCEDURE — 93306 TTE W/DOPPLER COMPLETE: CPT | Performed by: INTERNAL MEDICINE

## 2017-07-25 PROCEDURE — 99223 1ST HOSP IP/OBS HIGH 75: CPT | Performed by: HOSPITALIST

## 2017-07-25 RX ORDER — DOCUSATE SODIUM 100 MG/1
100 CAPSULE, LIQUID FILLED ORAL DAILY
Status: DISCONTINUED | OUTPATIENT
Start: 2017-07-25 | End: 2017-07-29

## 2017-07-25 RX ORDER — CLOPIDOGREL BISULFATE 75 MG/1
75 TABLET ORAL DAILY
Status: DISCONTINUED | OUTPATIENT
Start: 2017-07-25 | End: 2017-07-29

## 2017-07-25 RX ORDER — SEVELAMER CARBONATE 800 MG/1
800 TABLET, FILM COATED ORAL
Status: DISCONTINUED | OUTPATIENT
Start: 2017-07-25 | End: 2017-07-29

## 2017-07-25 RX ORDER — ATORVASTATIN CALCIUM 10 MG/1
10 TABLET, FILM COATED ORAL NIGHTLY
Status: DISCONTINUED | OUTPATIENT
Start: 2017-07-25 | End: 2017-07-29

## 2017-07-25 RX ORDER — POTASSIUM CHLORIDE 20 MEQ/1
40 TABLET, EXTENDED RELEASE ORAL DAILY
Status: DISCONTINUED | OUTPATIENT
Start: 2017-07-25 | End: 2017-07-29

## 2017-07-25 RX ORDER — DEXTROSE MONOHYDRATE 25 G/50ML
50 INJECTION, SOLUTION INTRAVENOUS AS NEEDED
Status: DISCONTINUED | OUTPATIENT
Start: 2017-07-25 | End: 2017-07-29

## 2017-07-25 RX ORDER — ALBUMIN (HUMAN) 12.5 G/50ML
100 SOLUTION INTRAVENOUS AS NEEDED
Status: DISCONTINUED | OUTPATIENT
Start: 2017-07-26 | End: 2017-07-27

## 2017-07-25 RX ORDER — HYDROCODONE BITARTRATE AND ACETAMINOPHEN 10; 325 MG/1; MG/1
2 TABLET ORAL EVERY 4 HOURS PRN
Status: DISCONTINUED | OUTPATIENT
Start: 2017-07-25 | End: 2017-07-29

## 2017-07-25 RX ORDER — HEPARIN SODIUM 5000 [USP'U]/ML
5000 INJECTION, SOLUTION INTRAVENOUS; SUBCUTANEOUS EVERY 12 HOURS SCHEDULED
Status: DISCONTINUED | OUTPATIENT
Start: 2017-07-25 | End: 2017-07-29

## 2017-07-25 RX ORDER — ASPIRIN 81 MG/1
81 TABLET, CHEWABLE ORAL DAILY
Status: DISCONTINUED | OUTPATIENT
Start: 2017-07-25 | End: 2017-07-29

## 2017-07-25 RX ORDER — ZOLPIDEM TARTRATE 5 MG/1
5 TABLET ORAL NIGHTLY PRN
Status: DISCONTINUED | OUTPATIENT
Start: 2017-07-25 | End: 2017-07-29

## 2017-07-25 RX ORDER — ESCITALOPRAM OXALATE 10 MG/1
10 TABLET ORAL EVERY EVENING
Status: DISCONTINUED | OUTPATIENT
Start: 2017-07-25 | End: 2017-07-29

## 2017-07-25 RX ORDER — LORAZEPAM 0.5 MG/1
0.5 TABLET ORAL EVERY 4 HOURS PRN
Status: DISCONTINUED | OUTPATIENT
Start: 2017-07-25 | End: 2017-07-29

## 2017-07-25 RX ORDER — ZOLPIDEM TARTRATE 5 MG/1
5 TABLET ORAL NIGHTLY PRN
COMMUNITY
End: 2018-01-18

## 2017-07-25 RX ORDER — SODIUM CHLORIDE 0.9 % (FLUSH) 0.9 %
3 SYRINGE (ML) INJECTION AS NEEDED
Status: DISCONTINUED | OUTPATIENT
Start: 2017-07-25 | End: 2017-07-29

## 2017-07-25 RX ORDER — METOPROLOL SUCCINATE 25 MG/1
25 TABLET, EXTENDED RELEASE ORAL 2 TIMES DAILY
Status: DISCONTINUED | OUTPATIENT
Start: 2017-07-25 | End: 2017-07-29

## 2017-07-25 NOTE — WOUND PROGRESS NOTE
WOUND CARE NOTE      PLAN   Recommendations:  May consider hand surgery evaluation  Glucose control to help promote wound healing    Wound(s)  Location: Bilateral palms, bilateral fingers 1-5  Cleansing  Wound Cleanser  Topical Silvadene  Dressings 2x2's 07/25/2017   ALT 14 (L) 07/25/2017   MG 2.0 07/25/2017   PHOS 7.2 (H) 03/27/2017

## 2017-07-25 NOTE — H&P
[facility]    History & Physical    Elfreda Phenes Patient Status:  Inpatient    1960 MRN [id number]   Location [facility] 3W/SW Attending Hansel Lorenz MD   Hosp Day # 0 CARITO Sparks     Date:  2017  Date of Admi BARE METAL STENT (BMS)      Comment: x2  No date: OTHER SURGICAL HISTORY      Comment: right index finger amputation  No date: OTHER SURGICAL HISTORY      Comment: left brachiocephalic fiscula for dialysis  9-2-16: PART REMV BONE,DISTAL PHALANX Right Strip    escitalopram 10 MG Oral Tab        Review of Systems:   Constitutional: negative  Eyes: negative  Ears, nose, mouth, throat, and face: negative  Respiratory: negative  Cardiovascular: negative  Gastrointestinal: negative  Genitourinary:negative  M 07/25/2017   CL 97 07/25/2017   CO2 32 07/25/2017   GLU 91 07/25/2017   CA 8.8 07/25/2017   ALB 2.2 (L) 07/25/2017   ALKPHO 80 07/25/2017   BILT 1.1 07/25/2017   TP 6.0 07/25/2017   AST 20 07/25/2017   ALT 14 (L) 07/25/2017   INR 1.9 (H) 07/25/2017   TSH 2 12-lead    Result Date: 7/25/2017  ECG Report  Interpretation  --------------------------     Ekg 12-lead    Result Date: 7/24/2017  ECG Report  Interpretation  --------------------------       Assessment/Plan:     Recurrent falls  -secondary to deconditio

## 2017-07-25 NOTE — PROGRESS NOTES
Pharmacy Note: Dietary Supplement Discontinuation Per Policy    OSTEO BI-FLEX JOINT SHIELD TABS 1 tablet has been discontinued on Prudence Santos per policy. This supplement may be restarted upon discharge using the medication reconciliation process.

## 2017-07-25 NOTE — PLAN OF CARE
RISK FOR INFECTION - ADULT    • Absence of fever/infection during anticipated neutropenic period Not Progressing          CARDIOVASCULAR - ADULT    • Maintains optimal cardiac output and hemodynamic stability Progressing    • Absence of cardiac arrhythmias

## 2017-07-25 NOTE — ED PROVIDER NOTES
Patient Seen in: [facility] AND [facility] Emergency Department    History   Patient presents with:  Fall (musculoskeletal, neurologic)    Stated Complaint: Multiple falls    HPI    49-year-old male patient presents brought by his family members complaining of r CATARACT Bilateral      Comment: at age 37  No date: CATH BARE METAL STENT (BMS)      Comment: x2  No date: OTHER SURGICAL HISTORY      Comment: right index finger amputation  No date: OTHER SURGICAL HISTORY      Comment: left brachiocephalic fiscula for d falls  Other systems are as noted in HPI. Constitutional and vital signs reviewed. All other systems reviewed and negative except as noted above. PSFH elements reviewed from today and agreed except as otherwise stated in HPI.     Physical Exam   ED following:     Troponin 0.04 (*)     All other components within normal limits   BNP (B TYPE NATRIUERTIC PEPTIDE) - Abnormal; Notable for the following:     Beta Natriuretic Peptide 1,225 (*)     All other components within normal limits   CBC W/ DIFFERENT

## 2017-07-26 LAB
ANION GAP SERPL CALC-SCNC: 11 MMOL/L (ref 0–18)
BASOPHILS # BLD: 0.1 K/UL (ref 0–0.2)
BASOPHILS NFR BLD: 2 %
BUN SERPL-MCNC: 25 MG/DL (ref 8–20)
BUN/CREAT SERPL: 4.6 (ref 10–20)
CALCIUM SERPL-MCNC: 9 MG/DL (ref 8.5–10.5)
CHLORIDE SERPL-SCNC: 94 MMOL/L (ref 95–110)
CO2 SERPL-SCNC: 29 MMOL/L (ref 22–32)
CREAT SERPL-MCNC: 5.45 MG/DL (ref 0.5–1.5)
EOSINOPHIL # BLD: 0.3 K/UL (ref 0–0.7)
EOSINOPHIL NFR BLD: 5 %
ERYTHROCYTE [DISTWIDTH] IN BLOOD BY AUTOMATED COUNT: 20.3 % (ref 11–15)
GLUCOSE BLDC GLUCOMTR-MCNC: 120 MG/DL (ref 70–99)
GLUCOSE BLDC GLUCOMTR-MCNC: 139 MG/DL (ref 70–99)
GLUCOSE BLDC GLUCOMTR-MCNC: 88 MG/DL (ref 70–99)
GLUCOSE SERPL-MCNC: 91 MG/DL (ref 70–99)
HCT VFR BLD AUTO: 28.7 % (ref 41–52)
HGB BLD-MCNC: 9.2 G/DL (ref 13.5–17.5)
LYMPHOCYTES # BLD: 1.2 K/UL (ref 1–4)
LYMPHOCYTES NFR BLD: 20 %
MCH RBC QN AUTO: 30.5 PG (ref 27–32)
MCHC RBC AUTO-ENTMCNC: 32 G/DL (ref 32–37)
MCV RBC AUTO: 95.2 FL (ref 80–100)
MONOCYTES # BLD: 0.9 K/UL (ref 0–1)
MONOCYTES NFR BLD: 14 %
NEUTROPHILS # BLD AUTO: 3.6 K/UL (ref 1.8–7.7)
NEUTROPHILS NFR BLD: 59 %
OSMOLALITY UR CALC.SUM OF ELEC: 282 MOSM/KG (ref 275–295)
PLATELET # BLD AUTO: 332 K/UL (ref 140–400)
PMV BLD AUTO: 7.4 FL (ref 7.4–10.3)
POTASSIUM SERPL-SCNC: 3.8 MMOL/L (ref 3.3–5.1)
RBC # BLD AUTO: 3.02 M/UL (ref 4.5–5.9)
SODIUM SERPL-SCNC: 134 MMOL/L (ref 136–144)
WBC # BLD AUTO: 6.2 K/UL (ref 4–11)

## 2017-07-26 PROCEDURE — 5A1D60Z PERFORMANCE OF URINARY FILTRATION, MULTIPLE: ICD-10-PCS | Performed by: INTERNAL MEDICINE

## 2017-07-26 PROCEDURE — 99233 SBSQ HOSP IP/OBS HIGH 50: CPT | Performed by: HOSPITALIST

## 2017-07-26 RX ORDER — MORPHINE SULFATE 2 MG/ML
1 INJECTION, SOLUTION INTRAMUSCULAR; INTRAVENOUS ONCE
Status: COMPLETED | OUTPATIENT
Start: 2017-07-26 | End: 2017-07-26

## 2017-07-26 RX ORDER — SODIUM CHLORIDE 9 MG/ML
INJECTION, SOLUTION INTRAVENOUS
Status: DISPENSED
Start: 2017-07-26 | End: 2017-07-27

## 2017-07-26 RX ORDER — CLINDAMYCIN PHOSPHATE 600 MG/50ML
600 INJECTION INTRAVENOUS EVERY 8 HOURS
Status: DISCONTINUED | OUTPATIENT
Start: 2017-07-26 | End: 2017-07-29

## 2017-07-26 NOTE — PROGRESS NOTES
[facility]    Progress Note    Prudence Santos Patient Status:  Inpatient    1960 MRN [id number]   Location [facility] 3W/SW Attending Christiano Espinoza MD   Hosp Day # 1 PCP Radhika Fabian       Subjective:   Prudence Santos although can't exclude areas of old infarction on the right. 4. No left-sided scalp hematoma. Calvarium intact. Tiny subcentimeter calcified meningioma suspected at the left parietal bone/inner table. 5. Intracranial atherosclerosis.          Xr Chest Ap Po Acute on chronic congestive heart failure, unspecified congestive heart failure type ([facility] [address]  -echo noted  - will consult cards tomorrow   - hold diuretics for now        Elevated troponin  - most likely secondary to fluid overload  - follow troponins  - car

## 2017-07-26 NOTE — PHYSICAL THERAPY NOTE
PHYSICAL THERAPY EVALUATION - INPATIENT     Room Number: 204/343-I  Evaluation Date: 7/26/2017  Type of Evaluation: Initial          Reason for Therapy: Mobility Dysfunction and Discharge Planning    PHYSICAL THERAPY ASSESSMENT     Patient is a 60 year o AMPUTATION FINGER/THUMB Right      Comment: index finger  No date: AMPUTATION FOOT,TRANSMETATARSAL Bilateral  No date: CATARACT Bilateral      Comment: at age 37  No date: CATH BARE METAL STENT (BMS)      Comment: x2  No date: OTHER SURGICAL HISTORY      C 41.77%   Standardized Score (AM-PAC Scale): 45.44   CMS Modifier (G-Code): CK    FUNCTIONAL ABILITY STATUS  Gait Assessment   Gait Assistance: Not tested (SPT)    Bed Mobility: SBA    Transfers:SBA    Exercise/Education Provided:  pt ed on use of platform

## 2017-07-26 NOTE — PLAN OF CARE
Pt c/o back pain. Prn Norco 10/235 2 tabs given at 0845. Patient continues c/o ruperto and stated that \"i take 2 norco every 2hours for pain\". Notified to MD. Per order morphINE sulfate 1 mg IVP given at 1221.  This writer informed patient HD scheduled around

## 2017-07-26 NOTE — CONSULTS
[facility]    Report of Consultation    Darya Juneserg Patient Status:  Inpatient    1960 MRN [id number]   Location [facility] 3W/SW Attending Mauricio Esposito MD   Hosp Day # 1 PCP Juan F Bassett     Date of Admission:   PART REMV BONE,DISTAL PHALANX Right      Comment: Ex wound R thumb    Family History  Family History   Problem Relation Age of Onset   • Heart Disorder Father        Social History  Patient Guardian Status:  Not on file.     Other Topics            Concern DOSE).   LORazepam 0.5 MG Oral Tab Take 0.5 mg by mouth every 4 (four) hours as needed for Anxiety.  Take one tablet twice per day, may take three times on dialysis days   Misc Natural Products (OSTEO BI-FLEX JOINT SHIELD) Oral Tab Take 1 tablet by mouth 3 bilaterally  Cardiovascular: S1, S2 normal  Abdominal: soft, non-tender  Extremities: LUE AVF , no LE edema , b/l foot amputation with b/l partail digital amputation     Results:     Laboratory Data:    Lab Results  Component Value Date   WBC 6.2 07/26/201 Hd scheduled today . Next HD 7/28 unless needed earlier     Anemia in ESRD   Hgb < goal ( 10-11 g/dl). Will start KESHAV 3 x week       CHF/ CAD    Discontinue diuretics. Not fluid overloaded clinically .  On Plavix       Recurrent falls / Contusion of head

## 2017-07-27 ENCOUNTER — APPOINTMENT (OUTPATIENT)
Dept: MRI IMAGING | Facility: HOSPITAL | Age: 57
DRG: 291 | End: 2017-07-27
Attending: HOSPITALIST
Payer: MEDICARE

## 2017-07-27 LAB
ANION GAP SERPL CALC-SCNC: 11 MMOL/L (ref 0–18)
BASOPHILS # BLD: 0.1 K/UL (ref 0–0.2)
BASOPHILS NFR BLD: 1 %
BUN SERPL-MCNC: 16 MG/DL (ref 8–20)
BUN/CREAT SERPL: 3.9 (ref 10–20)
CALCIUM SERPL-MCNC: 9.1 MG/DL (ref 8.5–10.5)
CHLORIDE SERPL-SCNC: 94 MMOL/L (ref 95–110)
CO2 SERPL-SCNC: 26 MMOL/L (ref 22–32)
CREAT SERPL-MCNC: 4.15 MG/DL (ref 0.5–1.5)
EOSINOPHIL # BLD: 0.2 K/UL (ref 0–0.7)
EOSINOPHIL NFR BLD: 3 %
ERYTHROCYTE [DISTWIDTH] IN BLOOD BY AUTOMATED COUNT: 19.8 % (ref 11–15)
GLUCOSE BLDC GLUCOMTR-MCNC: 100 MG/DL (ref 70–99)
GLUCOSE BLDC GLUCOMTR-MCNC: 107 MG/DL (ref 70–99)
GLUCOSE BLDC GLUCOMTR-MCNC: 89 MG/DL (ref 70–99)
GLUCOSE BLDC GLUCOMTR-MCNC: 90 MG/DL (ref 70–99)
GLUCOSE SERPL-MCNC: 96 MG/DL (ref 70–99)
HCT VFR BLD AUTO: 30.1 % (ref 41–52)
HGB BLD-MCNC: 9.5 G/DL (ref 13.5–17.5)
LYMPHOCYTES # BLD: 1.1 K/UL (ref 1–4)
LYMPHOCYTES NFR BLD: 18 %
MCH RBC QN AUTO: 30.5 PG (ref 27–32)
MCHC RBC AUTO-ENTMCNC: 31.7 G/DL (ref 32–37)
MCV RBC AUTO: 96.4 FL (ref 80–100)
MONOCYTES # BLD: 0.8 K/UL (ref 0–1)
MONOCYTES NFR BLD: 12 %
NEUTROPHILS # BLD AUTO: 4 K/UL (ref 1.8–7.7)
NEUTROPHILS NFR BLD: 65 %
OSMOLALITY UR CALC.SUM OF ELEC: 273 MOSM/KG (ref 275–295)
PLATELET # BLD AUTO: 330 K/UL (ref 140–400)
PMV BLD AUTO: 7.7 FL (ref 7.4–10.3)
POTASSIUM SERPL-SCNC: 4.2 MMOL/L (ref 3.3–5.1)
RBC # BLD AUTO: 3.12 M/UL (ref 4.5–5.9)
SODIUM SERPL-SCNC: 131 MMOL/L (ref 136–144)
WBC # BLD AUTO: 6.2 K/UL (ref 4–11)

## 2017-07-27 PROCEDURE — 70551 MRI BRAIN STEM W/O DYE: CPT | Performed by: HOSPITALIST

## 2017-07-27 PROCEDURE — 99233 SBSQ HOSP IP/OBS HIGH 50: CPT | Performed by: HOSPITALIST

## 2017-07-27 RX ORDER — ALBUMIN (HUMAN) 12.5 G/50ML
100 SOLUTION INTRAVENOUS AS NEEDED
Status: DISCONTINUED | OUTPATIENT
Start: 2017-07-28 | End: 2017-07-29

## 2017-07-27 RX ORDER — LISINOPRIL 5 MG/1
5 TABLET ORAL DAILY
Status: DISCONTINUED | OUTPATIENT
Start: 2017-07-27 | End: 2017-07-29

## 2017-07-27 NOTE — PLAN OF CARE
Achieve highest/safest level of independence in self care Not Progressing      Achieve highest/safest level of mobility/gait Not Progressing      Free from fall injury Progressing    Pt lives at home with wife, appears to rely on wife for a lot of his care is very anxious for discharge, reinforced that he has to be in stable condition and with all testing completed prior to discharge.   Patient/Family Long Term Goal Progressing      Patient/Family Short Term Goal Progressing    Pt is anxious for discharge to

## 2017-07-27 NOTE — CONSULTS
[facility]    PATIENT'S NAME: Leanne Belen   ATTENDING PHYSICIAN: Burt Mendez MD   CONSULTING PHYSICIAN: Celestino Newton.  Dorinda Angel MD   PATIENT ACCOUNT#:   [id number]    LOCATION:  [address] [place] [address] #:   [id number]       DATE OF BIRTH:  with 2 sons. Wife present at the bedside. He is a disabled . REVIEW OF SYSTEMS:  Otherwise negative. PHYSICAL EXAMINATION:    GENERAL:  Well-developed, well-nourished male in no acute distress. Alert and oriented x3.     VIT MD  d: 07/27/2017 17:50:59  t: 07/27/2017 18:23:29  Gilberto Mims [id number]  [facility]/

## 2017-07-27 NOTE — CONSULTS
PLASTIC SURGERY      Patient well known to me. Please see previous records and consults. PV occlusive disease, renal failure, repeated hand trauma, including \"picking\" at his fingers.     We have treated with topical Silvadene which keeps the wounds i

## 2017-07-27 NOTE — PLAN OF CARE
Called to patient's room by his wife and found pt on the floor, sitting between his bed and his wheelchair. Wife states they were trying to get him to the wheelchair and they thought they had locked it properly but one side wasn't locked.  They both stated

## 2017-07-27 NOTE — DIETARY NOTE
ADULT NUTRITION INITIAL ASSESSMENT    Pt is at moderate nutrition risk. Pt does not meet malnutrition criteria.       RECOMMENDATIONS TO MD:  Recommendations to MD: RD arranged for Oral Nutrition Supplement (ONS) per pt preference to maximize Nutrition taken at home    - Feeding assistance: meal set up and assit pt with meals, encourage PO and supplement intake    - Nutrition education: Pt followed by RD at Renal Dialysis Unit    - Coordination of nutrition care: collaboration with other providers    - D Larry score 12    NUTRITION PRESCRIPTION:  Diet: Renal  Oral Supplements: Nepro  Calories: 2147-6003 calories/day (28-30 calories per kg)  Protein:  grams protein/day (1.4 grams protein per kg adjusted IBW)    MONITOR AND EVALUATE/NUTRITION GOALS:

## 2017-07-27 NOTE — CONSULTS
Cardiology (Consuklt dictated)    Assessment:  1. Mechanical fall. Not likely cardiac in origin    2. Severe PVD    3. Decline in EF. History of PCI in remote past. R/O new disease.       Plan:  Stress test  Add lisinopril, monitor potassium    Thank you

## 2017-07-27 NOTE — PLAN OF CARE
Addendum to prior note:   Patient didn't have prosthetic legs on, and encouraged to have these on during transfers or when out of bed.

## 2017-07-27 NOTE — PHYSICAL THERAPY NOTE
PT tx attempted in p.m. However pt on floor secondary to trying to get back to bed from w/c but forgot to lock wheel chair. Nursing lifting pt back to bed.

## 2017-07-27 NOTE — PROGRESS NOTES
[facility]    Progress Note    Kavita Sandoval Patient Status:  Inpatient    1960 MRN [id number]   Location [facility] [place] 3W/SW Attending Kamini Jacobson MD   Hosp Day # 2 PCP Shashi Sampson       Subjective:   Kavita Sandoval changes although can't exclude areas of old infarction on the right. 4. No left-sided scalp hematoma. Calvarium intact. Tiny subcentimeter calcified meningioma suspected at the left parietal bone/inner table. 5. Intracranial atherosclerosis.          Chris Malcolm MD  07/27/17

## 2017-07-27 NOTE — PHYSICAL THERAPY NOTE
PT treatment attempted for gait training with platform walker but pt found with digits bleeding, RN informed & requests hold PT until dressings applied.

## 2017-07-27 NOTE — CONSULTS
[facility]    PATIENT'S NAME: Yossi Burtonica   ATTENDING PHYSICIAN: Marianna Leonard MD   CONSULTING PHYSICIAN: Denita Le MD   PATIENT ACCOUNT#:   [id number]    LOCATION:  [address] [place] [address] #:   [id number]       DATE OF

## 2017-07-28 ENCOUNTER — APPOINTMENT (OUTPATIENT)
Dept: NUCLEAR MEDICINE | Facility: HOSPITAL | Age: 57
DRG: 291 | End: 2017-07-28
Attending: INTERNAL MEDICINE
Payer: MEDICARE

## 2017-07-28 ENCOUNTER — APPOINTMENT (OUTPATIENT)
Dept: CV DIAGNOSTICS | Facility: HOSPITAL | Age: 57
DRG: 291 | End: 2017-07-28
Attending: INTERNAL MEDICINE
Payer: MEDICARE

## 2017-07-28 LAB
ANION GAP SERPL CALC-SCNC: 12 MMOL/L (ref 0–18)
BASOPHILS # BLD: 0.1 K/UL (ref 0–0.2)
BASOPHILS NFR BLD: 1 %
BUN SERPL-MCNC: 29 MG/DL (ref 8–20)
BUN/CREAT SERPL: 5.4 (ref 10–20)
CALCIUM SERPL-MCNC: 9.2 MG/DL (ref 8.5–10.5)
CHLORIDE SERPL-SCNC: 94 MMOL/L (ref 95–110)
CO2 SERPL-SCNC: 24 MMOL/L (ref 22–32)
CREAT SERPL-MCNC: 5.4 MG/DL (ref 0.5–1.5)
EOSINOPHIL # BLD: 0.1 K/UL (ref 0–0.7)
EOSINOPHIL NFR BLD: 2 %
ERYTHROCYTE [DISTWIDTH] IN BLOOD BY AUTOMATED COUNT: 20.6 % (ref 11–15)
GLUCOSE BLDC GLUCOMTR-MCNC: 121 MG/DL (ref 70–99)
GLUCOSE BLDC GLUCOMTR-MCNC: 146 MG/DL (ref 70–99)
GLUCOSE BLDC GLUCOMTR-MCNC: 68 MG/DL (ref 70–99)
GLUCOSE BLDC GLUCOMTR-MCNC: 90 MG/DL (ref 70–99)
GLUCOSE SERPL-MCNC: 89 MG/DL (ref 70–99)
HCT VFR BLD AUTO: 30 % (ref 41–52)
HGB BLD-MCNC: 9.7 G/DL (ref 13.5–17.5)
LYMPHOCYTES # BLD: 0.9 K/UL (ref 1–4)
LYMPHOCYTES NFR BLD: 13 %
MCH RBC QN AUTO: 30.9 PG (ref 27–32)
MCHC RBC AUTO-ENTMCNC: 32.3 G/DL (ref 32–37)
MCV RBC AUTO: 95.6 FL (ref 80–100)
MONOCYTES # BLD: 0.9 K/UL (ref 0–1)
MONOCYTES NFR BLD: 13 %
NEUTROPHILS # BLD AUTO: 5.1 K/UL (ref 1.8–7.7)
NEUTROPHILS NFR BLD: 71 %
OSMOLALITY UR CALC.SUM OF ELEC: 275 MOSM/KG (ref 275–295)
PLATELET # BLD AUTO: 363 K/UL (ref 140–400)
PMV BLD AUTO: 7.5 FL (ref 7.4–10.3)
POTASSIUM SERPL-SCNC: 4.6 MMOL/L (ref 3.3–5.1)
RBC # BLD AUTO: 3.14 M/UL (ref 4.5–5.9)
SODIUM SERPL-SCNC: 130 MMOL/L (ref 136–144)
WBC # BLD AUTO: 7.1 K/UL (ref 4–11)

## 2017-07-28 PROCEDURE — 93017 CV STRESS TEST TRACING ONLY: CPT | Performed by: INTERNAL MEDICINE

## 2017-07-28 PROCEDURE — 99233 SBSQ HOSP IP/OBS HIGH 50: CPT | Performed by: HOSPITALIST

## 2017-07-28 PROCEDURE — 78452 HT MUSCLE IMAGE SPECT MULT: CPT | Performed by: INTERNAL MEDICINE

## 2017-07-28 RX ORDER — 0.9 % SODIUM CHLORIDE 0.9 %
VIAL (ML) INJECTION
Status: COMPLETED
Start: 2017-07-28 | End: 2017-07-28

## 2017-07-28 RX ORDER — ALBUMIN (HUMAN) 12.5 G/50ML
100 SOLUTION INTRAVENOUS AS NEEDED
Status: DISCONTINUED | OUTPATIENT
Start: 2017-07-31 | End: 2017-07-29

## 2017-07-28 RX ORDER — SODIUM CHLORIDE 9 MG/ML
INJECTION, SOLUTION INTRAVENOUS
Status: DISPENSED
Start: 2017-07-28 | End: 2017-07-29

## 2017-07-28 RX ORDER — SODIUM CHLORIDE 9 MG/ML
INJECTION, SOLUTION INTRAVENOUS
Status: COMPLETED
Start: 2017-07-28 | End: 2017-07-28

## 2017-07-28 NOTE — PHYSICAL THERAPY NOTE
PM second attempt   Pt with dialysis  Not available at this time    Chart review of note ---  Did reveal pt admitted for falls  As well as during acute stay pt with 2 falls seen documentated  One on 7/27 and one early this AM  7/28/17

## 2017-07-28 NOTE — PLAN OF CARE
Problem: SAFETY ADULT - FALL  Goal: Free from fall injury  INTERVENTIONS:  - Assess pt frequently for physical needs  - Identify cognitive and physical deficits and behaviors that affect risk of falls.   - [place] fall precautions as indicated by assessme

## 2017-07-28 NOTE — DISCHARGE PLANNING
7/28/17 CM Discharge planning   Met with pt, resides with wife and son in family home. Pt states that family asissts with care at home. Pt is current with o/p dialysis MWF. Discharge planning discussed, short term rehab and [place] [phone] options provided.   Pt refu

## 2017-07-28 NOTE — PROGRESS NOTES
[facility] AND [facility]  Progress Note    Kavita Sandoval Patient Status:  Inpatient    1960 MRN [id number]   Location [facility] 3W/SW Attending Kamini Jacobson MD   Hosp Day # 3 PCP Shashi Solid     Assessment:    1. Mechanical fall.  Not likely Results  Component Value Date   TROP 0.04 () 07/25/2017   TROP 0.04 () 07/24/2017        Medications:    • lisinopril  5 mg Oral Daily   • epoetin loida  3,000 Units Subcutaneous Once per day on Mon Wed Fri   • clindamycin  600 mg Intravenous Q8H   • as

## 2017-07-28 NOTE — PROGRESS NOTES
[facility]    Progress Note    Esha Conde Patient Status:  Inpatient    1960 MRN [id number]   Location [facility] 3W/SW Attending Briseyda Bose MD   Hosp Day # 3 PCP Sloane Castorena       Subjective:   Esha Conde infarct or other acute finding. 2. Moderate changes of chronic small vessel disease in cerebral white matter are greater than age expected. 3. Chronic lacunar infarcts in bilateral thalami, and cerebellum related to small vessel disease.  4. Tiny remote cor

## 2017-07-28 NOTE — PROGRESS NOTES
[facility]    Progress Note    Melia Good Patient Status:  Inpatient    1960 MRN [id number]   Location [facility] 3W/SW Attending Sharif Garcia MD   Hosp Day # 3 PCP Rannie Jeans       Subjective:   Melia Good in the right parietal lobe.                Assessment and Plan:      Recurrent falls  -secondary to deconditioning from amputation and overall muscle weakness and heart failure  - Echo reveals EF 35% with severe TR  - appreciate cards reccs  - will most lik

## 2017-07-28 NOTE — PHYSICAL THERAPY NOTE
Chart reviewed for follow up treatment session      Pt is not available at this time   Attempt x 1  Pt is at stress test

## 2017-07-29 VITALS
BODY MASS INDEX: 29.33 KG/M2 | TEMPERATURE: 98 F | SYSTOLIC BLOOD PRESSURE: 109 MMHG | OXYGEN SATURATION: 99 % | HEIGHT: 68 IN | RESPIRATION RATE: 18 BRPM | HEART RATE: 88 BPM | DIASTOLIC BLOOD PRESSURE: 66 MMHG | WEIGHT: 193.5 LBS

## 2017-07-29 LAB
ANION GAP SERPL CALC-SCNC: 9 MMOL/L (ref 0–18)
BASOPHILS # BLD: 0.1 K/UL (ref 0–0.2)
BASOPHILS NFR BLD: 1 %
BUN SERPL-MCNC: 15 MG/DL (ref 8–20)
BUN/CREAT SERPL: 4 (ref 10–20)
CALCIUM SERPL-MCNC: 8.9 MG/DL (ref 8.5–10.5)
CHLORIDE SERPL-SCNC: 100 MMOL/L (ref 95–110)
CO2 SERPL-SCNC: 23 MMOL/L (ref 22–32)
CREAT SERPL-MCNC: 3.76 MG/DL (ref 0.5–1.5)
EOSINOPHIL # BLD: 0.2 K/UL (ref 0–0.7)
EOSINOPHIL NFR BLD: 3 %
ERYTHROCYTE [DISTWIDTH] IN BLOOD BY AUTOMATED COUNT: 20.5 % (ref 11–15)
GLUCOSE BLDC GLUCOMTR-MCNC: 95 MG/DL (ref 70–99)
GLUCOSE SERPL-MCNC: 95 MG/DL (ref 70–99)
HCT VFR BLD AUTO: 26.9 % (ref 41–52)
HGB BLD-MCNC: 8.7 G/DL (ref 13.5–17.5)
LYMPHOCYTES # BLD: 0.8 K/UL (ref 1–4)
LYMPHOCYTES NFR BLD: 12 %
MCH RBC QN AUTO: 30.6 PG (ref 27–32)
MCHC RBC AUTO-ENTMCNC: 32.2 G/DL (ref 32–37)
MCV RBC AUTO: 94.9 FL (ref 80–100)
MONOCYTES # BLD: 1 K/UL (ref 0–1)
MONOCYTES NFR BLD: 14 %
NEUTROPHILS # BLD AUTO: 4.7 K/UL (ref 1.8–7.7)
NEUTROPHILS NFR BLD: 70 %
OSMOLALITY UR CALC.SUM OF ELEC: 275 MOSM/KG (ref 275–295)
PLATELET # BLD AUTO: 292 K/UL (ref 140–400)
PMV BLD AUTO: 7.3 FL (ref 7.4–10.3)
POTASSIUM SERPL-SCNC: 3.8 MMOL/L (ref 3.3–5.1)
RBC # BLD AUTO: 2.84 M/UL (ref 4.5–5.9)
SODIUM SERPL-SCNC: 132 MMOL/L (ref 136–144)
WBC # BLD AUTO: 6.7 K/UL (ref 4–11)

## 2017-07-29 PROCEDURE — 99239 HOSP IP/OBS DSCHRG MGMT >30: CPT | Performed by: HOSPITALIST

## 2017-07-29 RX ORDER — LISINOPRIL 5 MG/1
5 TABLET ORAL DAILY
Qty: 30 TABLET | Refills: 0 | Status: SHIPPED | OUTPATIENT
Start: 2017-07-29 | End: 2018-01-12

## 2017-07-29 RX ORDER — CLINDAMYCIN HYDROCHLORIDE 300 MG/1
300 CAPSULE ORAL 3 TIMES DAILY
Qty: 21 CAPSULE | Refills: 0 | Status: SHIPPED | OUTPATIENT
Start: 2017-07-29 | End: 2017-08-05

## 2017-07-29 RX ORDER — LACTOBACILLUS RHAMNOSUS GG 10B CELL
1 CAPSULE ORAL DAILY
Qty: 7 CAPSULE | Refills: 0 | Status: SHIPPED | OUTPATIENT
Start: 2017-07-29 | End: 2017-08-28

## 2017-07-29 NOTE — DISCHARGE SUMMARY
[facility]    Discharge Summary    Fely Cornelius Patient Status:  Inpatient    1960 MRN [id number]   Location [facility] 3W/SW Attending Jessica Herrera MD   [facility] Day # 4 CARITO Campo     Date of Admission: 2017 denies LOC and palpitations when he falls. He claims he just loses his balance. He states his wounds on his hands have worsened and he does complain of increasing swelling and bleeding. Currently he denies nausea and vomiting and fever and chills.      Dis    HTN  - continue home meds      DVT proph-  SCDs no heparin due to bleeding      Full code       Complications: none     Consultants     Provider Role Specialty    Cyndia Harada, MD Consulting Physician  CARDIOLOGY     Rajan Bentley MD Consulting y mouth.    Homeopathic Products ([facility] LEG CRAMPS PAIN RELIEF OR)  Take by mouth.  Take as needed for cramping during hemodialysis     FreeStyle Lancets Does not apply Misc      FREESTYLE LITE TEST In Vitro Strip      escitalopram 10 MG Oral Tab times on dialysis days   Refills:  0     Metoprolol Succinate ER 25 MG Tb24  Commonly known as: Toprol XL      Take 25 mg by mouth 2 (two) times daily. Refills:  0     OSTEO BI-FLEX JOINT SHIELD Tabs      Take 1 tablet by mouth 3 (three) times daily.

## 2017-07-29 NOTE — DISCHARGE PLANNING
SW was informed by [facility] that pt is discharging today.     Joe Sánchez, [address]

## 2017-07-29 NOTE — OCCUPATIONAL THERAPY NOTE
Pt respectfully refused OT evaluation - stating \" I am going home, I have done my transfers, I don't need anything\"  Upon arrival in room, patient was removing bandages from hands, picking scabs and using sheets for the bleeding - RN made aware

## 2017-07-29 NOTE — PROGRESS NOTES
[facility] Heart Cardiology Progress Note      Heike Craven Patient Status:  Inpatient    1960 MRN [id number]   Location [facility] 3W/SW Attending Renetta Sanders MD   Hosp Day # 4 PCP Rich Guadarrama edema, bilat LE stumps well healed  Neuro: no focal deficits  Skin: no rashes or lesions, bruise on left hip, contusion, left side of face.      Scheduled Meds:   • lisinopril  5 mg Oral Daily   • epoetin loida  3,000 Units Subcutaneous Once per day on Mon W 07/27/17   0536  07/28/17   0604  07/29/17   0622   RBC  3.12*  3.14*  2.84*   HGB  9.5*  9.7*  8.7*   HCT  30.1*  30.0*  26.9*   MCV  96.4  95.6  94.9   MCH  30.5  30.9  30.6   MCHC  31.7*  32.3  32.2   RDW  19.8*  20.6*  20.5*   WBC  6.2  7.1  6.7   PLT

## 2017-07-29 NOTE — PHYSICAL THERAPY NOTE
Chart reviewed; Pt cleared by RN to be seen by PT. Pt refuses therapy today stating \"there is nothing new you can teach me\".  Patient to be D/C home today

## 2017-07-30 ENCOUNTER — TELEPHONE (OUTPATIENT)
Dept: MEDSURG UNIT | Facility: HOSPITAL | Age: 57
End: 2017-07-30

## 2017-08-10 ENCOUNTER — TELEPHONE (OUTPATIENT)
Dept: SURGERY | Facility: CLINIC | Age: 57
End: 2017-08-10

## 2017-08-10 ENCOUNTER — OFFICE VISIT (OUTPATIENT)
Dept: SURGERY | Facility: CLINIC | Age: 57
End: 2017-08-10

## 2017-08-10 DIAGNOSIS — S61.002A OPEN WOUND OF LEFT THUMB, INITIAL ENCOUNTER: Primary | ICD-10-CM

## 2017-08-10 PROCEDURE — G0463 HOSPITAL OUTPT CLINIC VISIT: HCPCS | Performed by: PLASTIC SURGERY

## 2017-08-10 PROCEDURE — 99213 OFFICE O/P EST LOW 20 MIN: CPT | Performed by: PLASTIC SURGERY
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
## 2017-08-10 NOTE — TELEPHONE ENCOUNTER
Julieth ARIAS  from [facility] home health called to clarify pt's home wound care in regards to appointment this am.   Per Dr Isabelle Ramirez patient is to wash all the hand wounds daily with soap and water then apply silvadene and gauze.   Verbalized understanding

## 2017-08-25 ENCOUNTER — TELEPHONE (OUTPATIENT)
Dept: SURGERY | Facility: CLINIC | Age: 57
End: 2017-08-25

## 2017-08-25 NOTE — TELEPHONE ENCOUNTER
Onel espinal nurse from [facility] called to inform Dr Brannon Lama that he is discharging the pt today and that he educated the pt and wife on how to change pt's bandages.      I texted the above message to Dr Brannon Lama. around 12:57pm and he immediately ans

## 2017-09-05 ENCOUNTER — HOSPITAL ENCOUNTER (OUTPATIENT)
Dept: INTERVENTIONAL RADIOLOGY/VASCULAR | Facility: HOSPITAL | Age: 57
Discharge: HOME | End: 2017-09-05
Attending: SURGERY | Admitting: SURGERY
Payer: MEDICARE

## 2017-09-05 VITALS — WEIGHT: 198 LBS | BODY MASS INDEX: 30 KG/M2

## 2017-09-05 DIAGNOSIS — N18.6 ESRD (END STAGE RENAL DISEASE) (HCC): ICD-10-CM

## 2017-09-05 PROCEDURE — 36902 INTRO CATH DIALYSIS CIRCUIT: CPT

## 2017-09-05 PROCEDURE — 057F3ZZ DILATION OF LEFT CEPHALIC VEIN, PERCUTANEOUS APPROACH: ICD-10-PCS | Performed by: RADIOLOGY

## 2017-09-05 RX ORDER — LIDOCAINE HYDROCHLORIDE 20 MG/ML
INJECTION, SOLUTION EPIDURAL; INFILTRATION; INTRACAUDAL; PERINEURAL
Status: COMPLETED
Start: 2017-09-05 | End: 2017-09-05

## 2018-01-04 ENCOUNTER — OFFICE VISIT (OUTPATIENT)
Dept: SURGERY | Facility: CLINIC | Age: 58
End: 2018-01-04

## 2018-01-04 DIAGNOSIS — S61.401A UNSPECIFIED OPEN WOUND OF RIGHT HAND, INITIAL ENCOUNTER: Primary | ICD-10-CM

## 2018-01-04 PROCEDURE — 99214 OFFICE O/P EST MOD 30 MIN: CPT | Performed by: PLASTIC SURGERY

## 2018-01-04 PROCEDURE — G0463 HOSPITAL OUTPT CLINIC VISIT: HCPCS | Performed by: PLASTIC SURGERY

## 2018-01-04 RX ORDER — CEPHALEXIN 500 MG/1
500 CAPSULE ORAL 2 TIMES DAILY
Qty: 14 CAPSULE | Refills: 0 | Status: SHIPPED | OUTPATIENT
Start: 2018-01-04

## 2018-01-04 NOTE — H&P
Nicky Saenz is a 59year old male that presents with Patient presents with:  Laceration: New injury s/p fall. Right hand posterior surface of RIF and MCP. Bloody purulent drainage. Wound: follow up multiple open wounds left hand. Kanwal Santos     REFERRED tablet twice per day, may take three times on dialysis days Disp:  Rfl:    Misc Natural Products (OSTEO BI-FLEX JOINT SHIELD) Oral Tab Take 1 tablet by mouth 3 (three) times daily.  Disp:  Rfl:    RENVELA 800 MG Oral Tab Take 800 mg by mouth 4 (four) times digits  No date: AMPUTATION FINGER/THUMB Right      Comment: index finger  No date: AMPUTATION FOOT,TRANSMETATARSAL Bilateral  No date: CATARACT Bilateral      Comment: at age 37  No date: CATH BARE METAL STENT (BMS)      Comment: x2  No date: OTHER SURGIC treat these, as well as the other wounds, with local care. They may not heal.  He may ultimately require skin grafting.     Wash twice daily, Silvadene, gauze, Spandaandrea  Keflex    10 days      1/4/2018  Stone Gibbons MD

## 2018-01-04 NOTE — PROGRESS NOTES
Per verbal order  from Dr Loco Perry  bilateral hands and R forearm wounds cleaned with sterile water then dried. Silvadene applied then dressed with gauze, kerlix and spandage.

## 2018-01-08 ENCOUNTER — HOSPITAL ENCOUNTER (INPATIENT)
Facility: HOSPITAL | Age: 58
LOS: 4 days | Discharge: HOME | DRG: 811 | End: 2018-01-12
Attending: EMERGENCY MEDICINE | Admitting: HOSPITALIST
Payer: MEDICARE

## 2018-01-08 DIAGNOSIS — D64.9 ANEMIA, UNSPECIFIED TYPE: Primary | ICD-10-CM

## 2018-01-08 DIAGNOSIS — R58 BLEEDING: ICD-10-CM

## 2018-01-08 DIAGNOSIS — K92.1 BLOOD IN STOOL: ICD-10-CM

## 2018-01-08 PROBLEM — K92.2 GI BLEED: Status: ACTIVE | Noted: 2018-01-08

## 2018-01-08 LAB
ANION GAP SERPL CALC-SCNC: 8 MMOL/L (ref 0–18)
ANTIBODY SCREEN: NEGATIVE
BUN SERPL-MCNC: 9 MG/DL (ref 8–20)
BUN/CREAT SERPL: 4.1 (ref 10–20)
CALCIUM SERPL-MCNC: 8.7 MG/DL (ref 8.5–10.5)
CHLORIDE SERPL-SCNC: 99 MMOL/L (ref 95–110)
CO2 SERPL-SCNC: 32 MMOL/L (ref 22–32)
CREAT SERPL-MCNC: 2.17 MG/DL (ref 0.5–1.5)
GLUCOSE BLDC GLUCOMTR-MCNC: 106 MG/DL (ref 70–99)
GLUCOSE BLDC GLUCOMTR-MCNC: 158 MG/DL (ref 70–99)
GLUCOSE SERPL-MCNC: 97 MG/DL (ref 70–99)
MRSA DNA SPEC QL NAA+PROBE: NEGATIVE
OSMOLALITY UR CALC.SUM OF ELEC: 287 MOSM/KG (ref 275–295)
POTASSIUM SERPL-SCNC: 3.1 MMOL/L (ref 3.3–5.1)
RH BLOOD TYPE: POSITIVE
SODIUM SERPL-SCNC: 139 MMOL/L (ref 136–144)

## 2018-01-08 PROCEDURE — 99223 1ST HOSP IP/OBS HIGH 75: CPT | Performed by: HOSPITALIST

## 2018-01-08 RX ORDER — ONDANSETRON 2 MG/ML
4 INJECTION INTRAMUSCULAR; INTRAVENOUS ONCE
Status: COMPLETED | OUTPATIENT
Start: 2018-01-08 | End: 2018-01-08

## 2018-01-08 RX ORDER — MORPHINE SULFATE 4 MG/ML
4 INJECTION, SOLUTION INTRAMUSCULAR; INTRAVENOUS EVERY 30 MIN PRN
Status: COMPLETED | OUTPATIENT
Start: 2018-01-08 | End: 2018-01-09

## 2018-01-08 RX ORDER — DEXTROSE MONOHYDRATE 25 G/50ML
50 INJECTION, SOLUTION INTRAVENOUS AS NEEDED
Status: DISCONTINUED | OUTPATIENT
Start: 2018-01-08 | End: 2018-01-12

## 2018-01-08 RX ORDER — ONDANSETRON 2 MG/ML
4 INJECTION INTRAMUSCULAR; INTRAVENOUS EVERY 6 HOURS PRN
Status: DISCONTINUED | OUTPATIENT
Start: 2018-01-08 | End: 2018-01-12

## 2018-01-08 RX ORDER — SODIUM CHLORIDE 9 MG/ML
INJECTION, SOLUTION INTRAVENOUS ONCE
Status: COMPLETED | OUTPATIENT
Start: 2018-01-08 | End: 2018-01-09

## 2018-01-08 RX ORDER — SODIUM CHLORIDE 0.9 % (FLUSH) 0.9 %
10 SYRINGE (ML) INJECTION AS NEEDED
Status: DISCONTINUED | OUTPATIENT
Start: 2018-01-08 | End: 2018-01-12

## 2018-01-08 RX ORDER — ACETAMINOPHEN 325 MG/1
650 TABLET ORAL ONCE
Status: COMPLETED | OUTPATIENT
Start: 2018-01-08 | End: 2018-01-09

## 2018-01-08 RX ORDER — DIPHENHYDRAMINE HCL 25 MG
25 CAPSULE ORAL ONCE
Status: COMPLETED | OUTPATIENT
Start: 2018-01-08 | End: 2018-01-09

## 2018-01-08 RX ORDER — ACETAMINOPHEN 325 MG/1
650 TABLET ORAL EVERY 6 HOURS PRN
Status: DISCONTINUED | OUTPATIENT
Start: 2018-01-08 | End: 2018-01-12

## 2018-01-08 RX ORDER — SODIUM CHLORIDE 0.9 % (FLUSH) 0.9 %
3 SYRINGE (ML) INJECTION AS NEEDED
Status: DISCONTINUED | OUTPATIENT
Start: 2018-01-08 | End: 2018-01-12

## 2018-01-09 LAB
ANION GAP SERPL CALC-SCNC: 10 MMOL/L (ref 0–18)
BASOPHILS # BLD: 0.1 K/UL (ref 0–0.2)
BASOPHILS # BLD: 0.1 K/UL (ref 0–0.2)
BASOPHILS NFR BLD: 2 %
BASOPHILS NFR BLD: 2 %
BUN SERPL-MCNC: 15 MG/DL (ref 8–20)
BUN/CREAT SERPL: 4.9 (ref 10–20)
CALCIUM SERPL-MCNC: 8.6 MG/DL (ref 8.5–10.5)
CHLORIDE SERPL-SCNC: 97 MMOL/L (ref 95–110)
CO2 SERPL-SCNC: 28 MMOL/L (ref 22–32)
CREAT SERPL-MCNC: 3.06 MG/DL (ref 0.5–1.5)
EOSINOPHIL # BLD: 0.1 K/UL (ref 0–0.7)
EOSINOPHIL # BLD: 0.1 K/UL (ref 0–0.7)
EOSINOPHIL NFR BLD: 2 %
EOSINOPHIL NFR BLD: 2 %
ERYTHROCYTE [DISTWIDTH] IN BLOOD BY AUTOMATED COUNT: 20.8 % (ref 11–15)
ERYTHROCYTE [DISTWIDTH] IN BLOOD BY AUTOMATED COUNT: 21.1 % (ref 11–15)
GLUCOSE BLDC GLUCOMTR-MCNC: 103 MG/DL (ref 70–99)
GLUCOSE BLDC GLUCOMTR-MCNC: 123 MG/DL (ref 70–99)
GLUCOSE BLDC GLUCOMTR-MCNC: 142 MG/DL (ref 70–99)
GLUCOSE BLDC GLUCOMTR-MCNC: 156 MG/DL (ref 70–99)
GLUCOSE BLDC GLUCOMTR-MCNC: 77 MG/DL (ref 70–99)
GLUCOSE SERPL-MCNC: 111 MG/DL (ref 70–99)
HBA1C MFR BLD: 5.1 % (ref 4–6)
HCT VFR BLD AUTO: 20.1 % (ref 41–52)
HCT VFR BLD AUTO: 24.6 % (ref 41–52)
HGB BLD-MCNC: 6.3 G/DL (ref 13.5–17.5)
HGB BLD-MCNC: 7.8 G/DL (ref 13.5–17.5)
LYMPHOCYTES # BLD: 0.8 K/UL (ref 1–4)
LYMPHOCYTES # BLD: 0.9 K/UL (ref 1–4)
LYMPHOCYTES NFR BLD: 14 %
LYMPHOCYTES NFR BLD: 15 %
MCH RBC QN AUTO: 30 PG (ref 27–32)
MCH RBC QN AUTO: 30.4 PG (ref 27–32)
MCHC RBC AUTO-ENTMCNC: 31.5 G/DL (ref 32–37)
MCHC RBC AUTO-ENTMCNC: 31.9 G/DL (ref 32–37)
MCV RBC AUTO: 93.9 FL (ref 80–100)
MCV RBC AUTO: 96.4 FL (ref 80–100)
MONOCYTES # BLD: 0.8 K/UL (ref 0–1)
MONOCYTES # BLD: 0.8 K/UL (ref 0–1)
MONOCYTES NFR BLD: 13 %
MONOCYTES NFR BLD: 14 %
NEUTROPHILS # BLD AUTO: 3.9 K/UL (ref 1.8–7.7)
NEUTROPHILS # BLD AUTO: 4.4 K/UL (ref 1.8–7.7)
NEUTROPHILS NFR BLD: 68 %
NEUTROPHILS NFR BLD: 69 %
OSMOLALITY UR CALC.SUM OF ELEC: 282 MOSM/KG (ref 275–295)
PLATELET # BLD AUTO: 196 K/UL (ref 140–400)
PLATELET # BLD AUTO: 213 K/UL (ref 140–400)
PMV BLD AUTO: 8.7 FL (ref 7.4–10.3)
PMV BLD AUTO: 9.1 FL (ref 7.4–10.3)
POTASSIUM SERPL-SCNC: 3.8 MMOL/L (ref 3.3–5.1)
RBC # BLD AUTO: 2.08 M/UL (ref 4.5–5.9)
RBC # BLD AUTO: 2.62 M/UL (ref 4.5–5.9)
SODIUM SERPL-SCNC: 135 MMOL/L (ref 136–144)
WBC # BLD AUTO: 5.7 K/UL (ref 4–11)
WBC # BLD AUTO: 6.3 K/UL (ref 4–11)

## 2018-01-09 PROCEDURE — 30233R1 TRANSFUSION OF NONAUTOLOGOUS PLATELETS INTO PERIPHERAL VEIN, PERCUTANEOUS APPROACH: ICD-10-PCS | Performed by: HOSPITALIST

## 2018-01-09 PROCEDURE — 99233 SBSQ HOSP IP/OBS HIGH 50: CPT | Performed by: HOSPITALIST

## 2018-01-09 PROCEDURE — 99223 1ST HOSP IP/OBS HIGH 75: CPT | Performed by: INTERNAL MEDICINE

## 2018-01-09 RX ORDER — SEVELAMER CARBONATE 800 MG/1
3200 TABLET, FILM COATED ORAL
Status: DISCONTINUED | OUTPATIENT
Start: 2018-01-09 | End: 2018-01-12

## 2018-01-09 RX ORDER — LORAZEPAM 0.5 MG/1
0.5 TABLET ORAL EVERY 4 HOURS PRN
Status: DISCONTINUED | OUTPATIENT
Start: 2018-01-09 | End: 2018-01-12

## 2018-01-09 RX ORDER — SEVELAMER CARBONATE 800 MG/1
800 TABLET, FILM COATED ORAL
Status: DISCONTINUED | OUTPATIENT
Start: 2018-01-09 | End: 2018-01-09

## 2018-01-09 RX ORDER — ESCITALOPRAM OXALATE 10 MG/1
10 TABLET ORAL DAILY
Status: DISCONTINUED | OUTPATIENT
Start: 2018-01-09 | End: 2018-01-12

## 2018-01-09 RX ORDER — METOPROLOL SUCCINATE 25 MG/1
25 TABLET, EXTENDED RELEASE ORAL 2 TIMES DAILY
Status: DISCONTINUED | OUTPATIENT
Start: 2018-01-09 | End: 2018-01-12

## 2018-01-09 RX ORDER — ZOLPIDEM TARTRATE 5 MG/1
5 TABLET ORAL NIGHTLY PRN
Status: DISCONTINUED | OUTPATIENT
Start: 2018-01-09 | End: 2018-01-12

## 2018-01-09 RX ORDER — ALBUMIN (HUMAN) 12.5 G/50ML
100 SOLUTION INTRAVENOUS
Status: DISCONTINUED | OUTPATIENT
Start: 2018-01-10 | End: 2018-01-11 | Stop reason: ALTCHOICE

## 2018-01-09 RX ORDER — ATORVASTATIN CALCIUM 10 MG/1
10 TABLET, FILM COATED ORAL DAILY
Status: DISCONTINUED | OUTPATIENT
Start: 2018-01-09 | End: 2018-01-12

## 2018-01-09 RX ORDER — HYDROCODONE BITARTRATE AND ACETAMINOPHEN 10; 325 MG/1; MG/1
2 TABLET ORAL EVERY 8 HOURS PRN
Status: DISCONTINUED | OUTPATIENT
Start: 2018-01-09 | End: 2018-01-12

## 2018-01-09 NOTE — PROGRESS NOTES
[facility]    Progress Note    Alvin Maria Elena Patient Status:  Inpatient    1960 MRN [id number]   Location [facility] 3W/SW Attending Syeda Bishop MD   Hosp Day # 1 PCP Niru Lomas       Subjective:   Tanisha Herrera 100 mL, Intravenous, PRN Dialysis  •  sevelamer (RENVELA) tab 800 mg, 3,200 mg, Oral, TID CC and HS  •  Pantoprazole Sodium (PROTONIX) 40 mg in Sodium Chloride 0.9 % 10 mL IV push, 40 mg, Intravenous, Daily  •  Normal Saline Flush 0.9 % injection 3 mL, 3 m

## 2018-01-09 NOTE — WOUND PROGRESS NOTE
WOUND CARE NOTE      PLAN   Recommendations:  Dietary consult for recommendations for nutrition to optimize wound healing    Wound(s)  Location: right hand  Cleansing  Wound Cleanser  Topical xeroform  Dressings 2x2  Secure with benito stevens  Frequency 03/27/2017

## 2018-01-09 NOTE — H&P
[facility]    PATIENT'S NAME: Lianet Fagan   ATTENDING PHYSICIAN: Soco March MD   PATIENT ACCOUNT#:   [id number]    LOCATION:  [place] [address]  MEDICAL RECORD #:   [id number]       YOB: 1960  ADMISSION DATE:       01/ digit stumps. No chest pain, no abdominal pain, no diarrhea. Other 12-point review of systems negative. PHYSICAL EXAMINATION:    GENERAL:  Alert and oriented to time, place, and person. Moderate distress.   VITAL SIGNS:  Temperature 97.5, pulse 101,

## 2018-01-09 NOTE — CONSULTS
[facility]    Report of Consultation    Geoff Yocastahelena Patient Status:  Inpatient    1960 MRN [id number]   Location [facility] 3W/SW Attending Chikis Tay MD   Hosp Day # 1 PCP Cecile Oneill     Date of Admission:   Problem Relation Age of Onset   • Heart Disorder Father        Social History  Smoking status: Former Smoker                                                              Packs/day: 1.00      Years: 0.00         Types: Cigarettes     Quit date: 11/23/2014 times on dialysis days   Misc Natural Products (OSTEO BI-FLEX JOINT SHIELD) Oral Tab Take 1 tablet by mouth 3 (three) times daily. RENVELA 800 MG Oral Tab Take 800 mg by mouth 4 (four) times daily with meals and nightly.      Metoprolol Succinate ER 25 MG Abdomen soft, nontender, nondistended, no organomegaly, bowel sounds present          Results:     Laboratory Data:  Recent Labs   Lab  01/08/18   1525  01/09/18   0924   RBC  2.08*  2.62*   HGB  6.3*  7.8*   HCT  20.1*  24.6*   MCV  96.4  93.9   MCH  30.4

## 2018-01-09 NOTE — ED PROVIDER NOTES
Patient Seen in: [facility] AND [facility] Emergency Department    History   Patient presents with:  Abnormal Result (metabolic, cardiac)    Stated Complaint:     HPI    Patient presents on the advice of staff at dialysis. He had low hemoglobin.   This unfortuna times daily. lisinopril 5 MG Oral Tab,  Take 1 tablet (5 mg total) by mouth daily. Zolpidem Tartrate 5 MG Oral Tab,  Take 5 mg by mouth nightly as needed for Sleep (UNSURE OF DOSE). escitalopram 10 MG Oral Tab,  10 mg daily.      LORazepam 0.5 MG Oral nausea, no vomiting      Positive for stated complaint:   Other systems are as noted in HPI. Constitutional and vital signs reviewed. All other systems reviewed and negative except as noted above.     PSFH elements reviewed from today and agreed excep this patient actually been working on his car and has been having chronic bleeding from his fingers. It is certainly possible that the bleeding is been having from his fingers may be enough to cause him to drop.     ED Course     Labs Reviewed   BASIC META GOLD   RAINBOW DRAW LAVENDER TALL (BNP)        MDM     98% Normal  Pulse oximetry     Disposition and Plan     We recommend that you schedule follow up care with a primary care provider within the next three months to obtain basic health screening includin

## 2018-01-09 NOTE — PLAN OF CARE
METABOLIC/FLUID AND ELECTROLYTES - ADULT    • Glucose maintained within prescribed range Progressing    • Electrolytes maintained within normal limits Progressing    • Hemodynamic stability and optimal renal function maintained Progressing        SKIN/TISS

## 2018-01-09 NOTE — CONSULTS
[facility]   Gastroenterology Consultation Note    Violet Ray  Patient Status:    Inpatient  Date of Admission:         1/8/2018, Hospital day #1  Attending:   Brittnee King MD  PCP:     Donovan Lord    Reason for Consultat osteomyelitis of right hand ([facility] 9-2-16    R thumb   • Anemia    • Anxiety state    • Cataract    • Cellulitis of hand, left    • Colon polyps    • Congestive heart disease ([facility]    • Coronary artery disease    • Diabetes [facility])    • Dialysis patient ([place] [address].) mg, 5 mg, Oral, Daily PRN  •  escitalopram (LEXAPRO) tablet 10 mg, 10 mg, Oral, Daily  •  Metoprolol Succinate ER (Toprol XL) 24 hr tab 25 mg, 25 mg, Oral, BID  •  sevelamer (RENVELA) tab 800 mg, 800 mg, Oral, TID CC and HS  •  Zolpidem Tartrate (AMBIEN) t conjunctivae pale, the sclera appears anicteric +poor dentition, MMM  CV: RRR  Lung: moves air well; no labored breathing  Abdomen: +striae +soft, non-tender, does not appear distended to me, +NABS are apreciated  Skin: dry, warm, no jaundice +pallor   Ext patient closely and observing for GI bleed prior to endoscopic intervention.    #ESRD    Recommend:  -HOLD Plavix in light of symptomatic anemia   -IV PPI   -MiraLAX as needed for constipation  -Monitor CBC/signs of bleeding carefully  -Document BMs  -Will

## 2018-01-10 LAB
ALBUMIN SERPL BCP-MCNC: 2.1 G/DL (ref 3.5–4.8)
ANION GAP SERPL CALC-SCNC: 11 MMOL/L (ref 0–18)
BASOPHILS # BLD: 0.1 K/UL (ref 0–0.2)
BASOPHILS NFR BLD: 2 %
BLOOD TYPE BARCODE: 5100
BUN SERPL-MCNC: 21 MG/DL (ref 8–20)
BUN/CREAT SERPL: 5.5 (ref 10–20)
CALCIUM SERPL-MCNC: 8.9 MG/DL (ref 8.5–10.5)
CHLORIDE SERPL-SCNC: 96 MMOL/L (ref 95–110)
CO2 SERPL-SCNC: 26 MMOL/L (ref 22–32)
CREAT SERPL-MCNC: 3.82 MG/DL (ref 0.5–1.5)
EOSINOPHIL # BLD: 0.2 K/UL (ref 0–0.7)
EOSINOPHIL NFR BLD: 2 %
ERYTHROCYTE [DISTWIDTH] IN BLOOD BY AUTOMATED COUNT: 21.2 % (ref 11–15)
GLUCOSE BLDC GLUCOMTR-MCNC: 104 MG/DL (ref 70–99)
GLUCOSE BLDC GLUCOMTR-MCNC: 106 MG/DL (ref 70–99)
GLUCOSE BLDC GLUCOMTR-MCNC: 125 MG/DL (ref 70–99)
GLUCOSE BLDC GLUCOMTR-MCNC: 86 MG/DL (ref 70–99)
GLUCOSE SERPL-MCNC: 76 MG/DL (ref 70–99)
HCT VFR BLD AUTO: 27.7 % (ref 41–52)
HGB BLD-MCNC: 8.9 G/DL (ref 13.5–17.5)
LYMPHOCYTES # BLD: 0.8 K/UL (ref 1–4)
LYMPHOCYTES NFR BLD: 11 %
MAGNESIUM SERPL-MCNC: 2.1 MG/DL (ref 1.8–2.5)
MCH RBC QN AUTO: 30.5 PG (ref 27–32)
MCHC RBC AUTO-ENTMCNC: 32.1 G/DL (ref 32–37)
MCV RBC AUTO: 95 FL (ref 80–100)
MONOCYTES # BLD: 0.8 K/UL (ref 0–1)
MONOCYTES NFR BLD: 12 %
NEUTROPHILS # BLD AUTO: 5.3 K/UL (ref 1.8–7.7)
NEUTROPHILS NFR BLD: 73 %
OSMOLALITY UR CALC.SUM OF ELEC: 278 MOSM/KG (ref 275–295)
PHOSPHATE SERPL-MCNC: 5.6 MG/DL (ref 2.4–4.7)
PLATELET # BLD AUTO: 205 K/UL (ref 140–400)
PMV BLD AUTO: 9.1 FL (ref 7.4–10.3)
POTASSIUM SERPL-SCNC: 4.4 MMOL/L (ref 3.3–5.1)
RBC # BLD AUTO: 2.92 M/UL (ref 4.5–5.9)
SODIUM SERPL-SCNC: 133 MMOL/L (ref 136–144)
WBC # BLD AUTO: 7.2 K/UL (ref 4–11)

## 2018-01-10 PROCEDURE — 99232 SBSQ HOSP IP/OBS MODERATE 35: CPT | Performed by: PHYSICIAN ASSISTANT

## 2018-01-10 PROCEDURE — 99233 SBSQ HOSP IP/OBS HIGH 50: CPT | Performed by: HOSPITALIST

## 2018-01-10 PROCEDURE — 5A1D70Z PERFORMANCE OF URINARY FILTRATION, INTERMITTENT, LESS THAN 6 HOURS PER DAY: ICD-10-PCS | Performed by: HOSPITALIST

## 2018-01-10 RX ORDER — SODIUM CHLORIDE 9 MG/ML
INJECTION, SOLUTION INTRAVENOUS
Status: DISPENSED
Start: 2018-01-10 | End: 2018-01-10

## 2018-01-10 RX ORDER — 0.9 % SODIUM CHLORIDE 0.9 %
VIAL (ML) INJECTION
Status: DISPENSED
Start: 2018-01-10 | End: 2018-01-10

## 2018-01-10 NOTE — PROGRESS NOTES
[facility]    Progress Note    Esha Conde Patient Status:  Inpatient    1960 MRN [id number]   Location [facility] 3W/SW Attending Carlos Davidson MD   Hosp Day # 2 PCP Sloane Castorena       Subjective:   Luis Alberto Arndt

## 2018-01-10 NOTE — PROGRESS NOTES
[facility]     Gastroenterology Progress Note    Amara Ahn Patient Status:  Inpatient    1960 MRN [id number]   Location [facility] 3W/SW Attending Kristofer Dykes MD   Hosp Day # 2 PCP Azul Wu patient - first treat empirically with PPI therapy - EGD or other endoscopic intervention reserved for signs of active bleeding.      Recommend:  -HOLD Plavix in light of symptomatic anemia   -IV PPI   -MiraLAX as needed for constipation  -Monitor CBC/sign

## 2018-01-10 NOTE — CM/SW NOTE
SW met with the patient at bedside. The patient lives with his wife in a 1 story home. He has a w/c and walker and his wife assists with ADLs, transfers, dressing and driving.   He goes to dialysis at [facility] and his wife has been driving him to and

## 2018-01-10 NOTE — PROGRESS NOTES
[facility]    Progress Note    Jose Askew Patient Status:  Inpatient    1960 MRN [id number]   Location [facility] 3W/SW Attending Piotr Hancock MD   Hosp Day # 2 PCP Levar Tinoco       Subjective:   Sherly Clement Wed Fri  •  Albumin Human (ALBUMINAR) 25 % solution 100 mL, 100 mL, Intravenous, PRN Dialysis  •  sevelamer (RENVELA) tab 800 mg, 3,200 mg, Oral, TID CC and HS  •  Pantoprazole Sodium (PROTONIX) 40 mg in Sodium Chloride 0.9 % 10 mL IV push, 40 mg, Intraven 21*   CREATSERUM  2.17*  3.06*  3.82*   GFRAA  38*  25*  20*   GFRNAA  31*  21*  16*   CA  8.7  8.6  8.9   NA  139  135*  133*   K  3.1*  3.8  4.4   CL  99  97  96   CO2  32  28  26         Imaging:               Tyrone Quiroz MD  1/10/2018

## 2018-01-10 NOTE — PROGRESS NOTES
[facility] - [facility]    Brief Note    Mahsa Has Patient Status:  Inpatient    1960 MRN [id number]   Location [facility] 3W/SW Attending Gonzales Hoff MD   Hosp Day # 2 PCP Ashli Robles     Date of Note:  1/10/2018  Time of

## 2018-01-10 NOTE — DIETARY NOTE
ADULT NUTRITION INITIAL ASSESSMENT    Pt is at moderate nutrition risk. Pt does not meet malnutrition criteria.       RECOMMENDATIONS TO MD:  RD to manage oral nutritional supplementation(ONS)     NUTRITION DIAGNOSIS/PROBLEM:  Increased nutrient needs rela 149 lbs adjusted for amputation      Now 128% IBW  Usual Body Wt: ~195 lbs (July 2017       Now 98% UBW  WEIGHT HISTORY:  Patient Weight(s) for the past 336 hrs:   Weight   01/10/18 0516 86.6 kg (190 lb 14.4 oz)   01/09/18 0505 84.8 kg (187 lb)   01/08/18

## 2018-01-10 NOTE — PLAN OF CARE
Diabetes/Glucose Control    • Glucose maintained within prescribed range Progressing        METABOLIC/FLUID AND ELECTROLYTES - ADULT    • Glucose maintained within prescribed range Progressing    • Hemodynamic stability and optimal renal function maintaine

## 2018-01-11 LAB
BASOPHILS # BLD: 0.1 K/UL (ref 0–0.2)
BASOPHILS NFR BLD: 1 %
EOSINOPHIL # BLD: 0.1 K/UL (ref 0–0.7)
EOSINOPHIL NFR BLD: 2 %
ERYTHROCYTE [DISTWIDTH] IN BLOOD BY AUTOMATED COUNT: 21.2 % (ref 11–15)
GLUCOSE BLDC GLUCOMTR-MCNC: 102 MG/DL (ref 70–99)
GLUCOSE BLDC GLUCOMTR-MCNC: 137 MG/DL (ref 70–99)
GLUCOSE BLDC GLUCOMTR-MCNC: 177 MG/DL (ref 70–99)
GLUCOSE BLDC GLUCOMTR-MCNC: 96 MG/DL (ref 70–99)
HCT VFR BLD AUTO: 23.7 % (ref 41–52)
HGB BLD-MCNC: 7.4 G/DL (ref 13.5–17.5)
LYMPHOCYTES # BLD: 0.5 K/UL (ref 1–4)
LYMPHOCYTES NFR BLD: 8 %
MCH RBC QN AUTO: 30 PG (ref 27–32)
MCHC RBC AUTO-ENTMCNC: 31 G/DL (ref 32–37)
MCV RBC AUTO: 96.7 FL (ref 80–100)
MONOCYTES # BLD: 0.8 K/UL (ref 0–1)
MONOCYTES NFR BLD: 11 %
NEUTROPHILS # BLD AUTO: 5.7 K/UL (ref 1.8–7.7)
NEUTROPHILS NFR BLD: 79 %
PLATELET # BLD AUTO: 173 K/UL (ref 140–400)
PMV BLD AUTO: 10.1 FL (ref 7.4–10.3)
RBC # BLD AUTO: 2.45 M/UL (ref 4.5–5.9)
WBC # BLD AUTO: 7.2 K/UL (ref 4–11)

## 2018-01-11 PROCEDURE — 99233 SBSQ HOSP IP/OBS HIGH 50: CPT | Performed by: HOSPITALIST

## 2018-01-11 PROCEDURE — 99232 SBSQ HOSP IP/OBS MODERATE 35: CPT | Performed by: PHYSICIAN ASSISTANT

## 2018-01-11 RX ORDER — MIDODRINE HYDROCHLORIDE 5 MG/1
5 TABLET ORAL
Status: DISCONTINUED | OUTPATIENT
Start: 2018-01-11 | End: 2018-01-12

## 2018-01-11 RX ORDER — SODIUM CHLORIDE 0.9 % (FLUSH) 0.9 %
10 SYRINGE (ML) INJECTION AS NEEDED
Status: DISCONTINUED | OUTPATIENT
Start: 2018-01-11 | End: 2018-01-12

## 2018-01-11 RX ORDER — ALBUMIN (HUMAN) 12.5 G/50ML
100 SOLUTION INTRAVENOUS
Status: DISCONTINUED | OUTPATIENT
Start: 2018-01-12 | End: 2018-01-12

## 2018-01-11 RX ORDER — SODIUM CHLORIDE 9 MG/ML
INJECTION, SOLUTION INTRAVENOUS ONCE
Status: COMPLETED | OUTPATIENT
Start: 2018-01-11 | End: 2018-01-12

## 2018-01-11 NOTE — OCCUPATIONAL THERAPY NOTE
OCCUPATIONAL THERAPY EVALUATION - INPATIENT     Room Number: 338/338-A  Evaluation Date: 1/11/2018  Type of Evaluation: Initial  Presenting Problem:  (GI bleed)    Physician Order: IP Consult to Occupational Therapy  Reason for Therapy: ADL/IADL Dysfunctio hour care/supervision;Cont skilled therapy in a supervised setting       PLAN  OT Treatment Plan: Balance activities; Energy conservation/work simplification techniques;ADL training;Functional transfer training; Endurance training;Patient/Family education; Pa Drives: No  Patient Regularly Uses:  (walking with Assist with RW until 1mnth ago, A for transfers)    Stairs in Home: 1 [place]  Use of Assistive Device(s): w/c    Prior Level of [place]: Patient lives at home with his wife.  He states he is nev poor  Dynamic Standing: poor      FUNCTIONAL ADL ASSESSMENT  Grooming: max a   Feeding: max a   Bathing: max a   Toileting: dep  Upper Extremity Dressing: max a   Lower Extremity Dressing: max a       Education Provided: Educated patient in role of OT and

## 2018-01-11 NOTE — PROGRESS NOTES
[facility]    Progress Note    Percy Glance Patient Status:  Inpatient    1960 MRN [id number]   Location [facility] 3W/SW Attending Álvaro Nelson MD   Hosp Day # 3 PCP Obie Ganser       Subjective:   Laura Rogers Dialysis  •  sevelamer (RENVELA) tab 800 mg, 3,200 mg, Oral, TID CC and HS  •  Pantoprazole Sodium (PROTONIX) 40 mg in Sodium Chloride 0.9 % 10 mL IV push, 40 mg, Intravenous, Daily  •  Normal Saline Flush 0.9 % injection 3 mL, 3 mL, Intravenous, PRN  •  a 97  111*  76   BUN  9  15  21*   CREATSERUM  2.17*  3.06*  3.82*   GFRAA  38*  25*  20*   GFRNAA  31*  21*  16*   CA  8.7  8.6  8.9   NA  139  135*  133*   K  3.1*  3.8  4.4   CL  99  97  96   CO2  32  28  26         Imaging:               Tyrone Packer

## 2018-01-11 NOTE — PHYSICAL THERAPY NOTE
PHYSICAL THERAPY EVALUATION - INPATIENT     Room Number: 338/338-A  Evaluation Date: 1/11/2018  Type of Evaluation: Initial  Physician Order: PT Eval and Treat    Presenting Problem:  (Anemia, Bleeding in stool)  Reason for Therapy: Mobility Dysfunctio Patient will benefit from continued IP PT services to address these deficits in prepar Pt is at high fall risk. ation for discharge. Recommend continuation of skilled PT in a rehab setting so pt can maximize functional potential and achieve PLF.   Pt pr Hemodialysis patient [facility])     M, W, F dialysis   • High blood pressure    • History of blood transfusion    • Hyperlipidemia    • Neuropathy    • Osteoarthritis    • Peripheral vascular disease ([address]    • Renal disorder     endstage renal failure       Pa mechanics;Breathing techniques;Repositioning    COGNITION  · Overall Cognitive Status:  WFL - within functional limits    RANGE OF MOTION AND STRENGTH ASSESSMENT  Upper extremity ROM and strength: Please see OT eval for UE assessment.      Lower extremity R 1/18/2018  Patient Goal Patient's self-stated goal is: to go home   Goal #1 Patient is able to demonstrate supine - sit EOB @ level: modified independent     Goal #1   Current Status    Goal #2 Patient is able to demonstrate transfers EOB to/from Chair/Whe

## 2018-01-11 NOTE — PROGRESS NOTES
[facility]     Gastroenterology Progress Note    Gary Other Patient Status:  Inpatient    1960 MRN [id number]   Location [facility] 3W/SW Attending Ama Cleaning MD   [facility] Day # 3 PCP Ramos Walls intervention reserved for signs of active bleeding. 1/11: Patent without black or bloody BMs.  Feels otherwise well.      Recommend:  -HOLD Plavix in light of symptomatic anemia   -IV PPI   -MiraLAX as needed for constipation  -Monitor CBC/signs of blee

## 2018-01-12 VITALS
SYSTOLIC BLOOD PRESSURE: 94 MMHG | TEMPERATURE: 98 F | WEIGHT: 197.38 LBS | DIASTOLIC BLOOD PRESSURE: 58 MMHG | HEIGHT: 67 IN | OXYGEN SATURATION: 99 % | HEART RATE: 93 BPM | RESPIRATION RATE: 18 BRPM | BODY MASS INDEX: 30.98 KG/M2

## 2018-01-12 LAB
ANION GAP SERPL CALC-SCNC: 10 MMOL/L (ref 0–18)
ANTIBODY SCREEN: NEGATIVE
BASOPHILS # BLD: 0.1 K/UL (ref 0–0.2)
BASOPHILS NFR BLD: 1 %
BUN SERPL-MCNC: 31 MG/DL (ref 8–20)
BUN/CREAT SERPL: 6.6 (ref 10–20)
CALCIUM SERPL-MCNC: 9 MG/DL (ref 8.5–10.5)
CHLORIDE SERPL-SCNC: 97 MMOL/L (ref 95–110)
CO2 SERPL-SCNC: 26 MMOL/L (ref 22–32)
CREAT SERPL-MCNC: 4.7 MG/DL (ref 0.5–1.5)
EOSINOPHIL # BLD: 0.1 K/UL (ref 0–0.7)
EOSINOPHIL NFR BLD: 2 %
ERYTHROCYTE [DISTWIDTH] IN BLOOD BY AUTOMATED COUNT: 21.1 % (ref 11–15)
GLUCOSE BLDC GLUCOMTR-MCNC: 86 MG/DL (ref 70–99)
GLUCOSE BLDC GLUCOMTR-MCNC: 96 MG/DL (ref 70–99)
GLUCOSE SERPL-MCNC: 100 MG/DL (ref 70–99)
HCT VFR BLD AUTO: 24.2 % (ref 41–52)
HGB BLD-MCNC: 7.6 G/DL (ref 13.5–17.5)
LYMPHOCYTES # BLD: 0.7 K/UL (ref 1–4)
LYMPHOCYTES NFR BLD: 13 %
MCH RBC QN AUTO: 30.2 PG (ref 27–32)
MCHC RBC AUTO-ENTMCNC: 31.5 G/DL (ref 32–37)
MCV RBC AUTO: 95.9 FL (ref 80–100)
MONOCYTES # BLD: 0.8 K/UL (ref 0–1)
MONOCYTES NFR BLD: 14 %
NEUTROPHILS # BLD AUTO: 3.9 K/UL (ref 1.8–7.7)
NEUTROPHILS NFR BLD: 70 %
OSMOLALITY UR CALC.SUM OF ELEC: 283 MOSM/KG (ref 275–295)
PLATELET # BLD AUTO: 169 K/UL (ref 140–400)
PMV BLD AUTO: 9.4 FL (ref 7.4–10.3)
POTASSIUM SERPL-SCNC: 4.4 MMOL/L (ref 3.3–5.1)
RBC # BLD AUTO: 2.52 M/UL (ref 4.5–5.9)
RH BLOOD TYPE: POSITIVE
SODIUM SERPL-SCNC: 133 MMOL/L (ref 136–144)
WBC # BLD AUTO: 5.6 K/UL (ref 4–11)

## 2018-01-12 PROCEDURE — 30233R1 TRANSFUSION OF NONAUTOLOGOUS PLATELETS INTO PERIPHERAL VEIN, PERCUTANEOUS APPROACH: ICD-10-PCS | Performed by: HOSPITALIST

## 2018-01-12 PROCEDURE — 99239 HOSP IP/OBS DSCHRG MGMT >30: CPT | Performed by: HOSPITALIST

## 2018-01-12 PROCEDURE — 99231 SBSQ HOSP IP/OBS SF/LOW 25: CPT | Performed by: INTERNAL MEDICINE

## 2018-01-12 RX ORDER — MIDODRINE HYDROCHLORIDE 5 MG/1
5 TABLET ORAL
Qty: 60 TABLET | Refills: 0 | Status: SHIPPED | OUTPATIENT
Start: 2018-01-12

## 2018-01-12 RX ORDER — PANTOPRAZOLE SODIUM 40 MG/1
40 TABLET, DELAYED RELEASE ORAL
Status: DISCONTINUED | OUTPATIENT
Start: 2018-01-13 | End: 2018-01-12

## 2018-01-12 RX ORDER — SODIUM CHLORIDE 9 MG/ML
INJECTION, SOLUTION INTRAVENOUS
Status: COMPLETED
Start: 2018-01-12 | End: 2018-01-12

## 2018-01-12 RX ORDER — PANTOPRAZOLE SODIUM 40 MG/1
40 TABLET, DELAYED RELEASE ORAL
Qty: 30 TABLET | Refills: 0 | Status: SHIPPED | OUTPATIENT
Start: 2018-01-13 | End: 2018-02-08

## 2018-01-12 NOTE — PROGRESS NOTES
[facility]     Gastroenterology Progress Note    Cassie Calvo Patient Status:  Inpatient    1960 MRN [id number]   Location [facility] 3W/SW Attending Jane Palma MD   Hosp Day # 4 PCP Prem Wu bloody BMs. Feels otherwise well. BP 74/50.  1/12: Per Dr. Spring Betancourt, to get one unit of PRBC with HD today - Hgb is stable at 7.6.  BP improved today.     Recommend:  - IV PPI   - MiraLAX PRN  - Endoscopic intervention reserved for signs of active GI bleed

## 2018-01-12 NOTE — PROGRESS NOTES
[facility]OSP - [facility]    Progress Note    Cassie Calvo Patient Status:  Inpatient    1960 MRN [id number]   Location [facility] 3W/SW Attending Jane Palma MD   Hosp Day # 4 PCP Prem Benitez       Subjective:   Corinne Armenta

## 2018-01-12 NOTE — PHYSICAL THERAPY NOTE
PHYSICAL THERAPY TREATMENT NOTE - INPATIENT    Room Number: 338/338-A       Presenting Problem:  (Anemia, Bleeding in stool)    Problem List  Principal Problem:    Anemia, unspecified type  Active Problems:    Anemia    Blood in stool    Bleeding    GI bl wheelchair, bedside commode, etc.): A Little   -   Moving from lying on back to sitting on the side of the bed?: A Little   How much help from another person does the patient currently need. ..   -   Moving to and from a bed to a chair (including a wheelcha

## 2018-01-12 NOTE — PLAN OF CARE
Diabetes/Glucose Control    • Glucose maintained within prescribed range Adequate for Discharge        METABOLIC/FLUID AND ELECTROLYTES - ADULT    • Glucose maintained within prescribed range Adequate for Discharge    • Electrolytes maintained within donavon

## 2018-01-12 NOTE — DISCHARGE PLANNING
Pt is a/o, denies pain, denies SOB. To be discharged today to home. Went over home health instructions. Went over medications and side effects with patient. Changed dressings, sent patient home with dressing supplies. D/C IV, site CDI, D/C tele.  Assured pa

## 2018-01-12 NOTE — DISCHARGE SUMMARY
[facility]    Discharge Summary    Scott Leary Patient Status:  Inpatient    1960 MRN [id number]   Location [facility] 3W/SW Attending Bri Street MD   Hosp Day # 4 PCP Anny Mack     Date of Admission: 20 S1 S2+, RRR  LUNGS:  CTAB  ABDOMEN: Non-distended, non-tender, BS+  EXTREMITIES: All upper extremity digits except left third finger with amputation stumps, dressing CDI.  Bilateral forefoot amputations.  No clubbing or cyanosis.   NEUROLOGIC: Cranial nerve Take 1 tablet (40 mg total) by mouth every morning before breakfast.   Quantity:  30 tablet  Refills:  0        CONTINUE taking these medications      Instructions Prescription details   aspirin 81 MG Tabs      Take 81 mg by mouth daily.    Refills:  0 tube   Quantity:  1 Tube  Refills:  0     STOOL SOFTENER OR      Take  by mouth. Refills:  0     Zolpidem Tartrate 5 MG Tabs  Commonly known as:  AMBIEN      Take 5 mg by mouth nightly as needed for Sleep (UNSURE OF DOSE).    Refills:  0        STOP takin

## 2018-01-13 LAB — BLOOD TYPE BARCODE: 5100

## 2018-01-13 NOTE — DISCHARGE PLANNING
Attempted to give discharge instructions to patient and wife as stated in previous note but wife refused and walked out with discharge instructions and scripts.  Gave patient as much information as possible regarding home health, new prescriptions and impor

## 2018-01-18 ENCOUNTER — OFFICE VISIT (OUTPATIENT)
Dept: SURGERY | Facility: CLINIC | Age: 58
End: 2018-01-18

## 2018-01-18 DIAGNOSIS — S61.401A UNSPECIFIED OPEN WOUND OF RIGHT HAND, INITIAL ENCOUNTER: Primary | ICD-10-CM

## 2018-01-18 DIAGNOSIS — S61.002A OPEN WOUND OF LEFT THUMB, INITIAL ENCOUNTER: ICD-10-CM

## 2018-01-18 PROCEDURE — G0463 HOSPITAL OUTPT CLINIC VISIT: HCPCS | Performed by: PLASTIC SURGERY

## 2018-01-18 PROCEDURE — 99212 OFFICE O/P EST SF 10 MIN: CPT | Performed by: PLASTIC SURGERY

## 2018-01-18 RX ORDER — ZOLPIDEM TARTRATE 10 MG/1
TABLET ORAL
Refills: 3 | COMMUNITY
Start: 2017-12-26 | End: 2018-02-08
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
## 2018-01-18 NOTE — PROGRESS NOTES
After pt was evaluated by Dr. Jonelle Summers, verbal orders to wash bilateral hands w/soap and water and apply silvadene, gauze, kerlix, and spandage to affected areas on bilateral hands. Orders completed.

## 2018-02-08 ENCOUNTER — OFFICE VISIT (OUTPATIENT)
Dept: SURGERY | Facility: CLINIC | Age: 58
End: 2018-02-08

## 2018-02-08 DIAGNOSIS — S61.002A OPEN WOUND OF LEFT THUMB, INITIAL ENCOUNTER: ICD-10-CM

## 2018-02-08 DIAGNOSIS — S61.401A UNSPECIFIED OPEN WOUND OF RIGHT HAND, INITIAL ENCOUNTER: Primary | ICD-10-CM

## 2018-02-08 PROCEDURE — 99212 OFFICE O/P EST SF 10 MIN: CPT | Performed by: PLASTIC SURGERY

## 2018-02-08 PROCEDURE — G0463 HOSPITAL OUTPT CLINIC VISIT: HCPCS | Performed by: PLASTIC SURGERY

## 2018-02-08 RX ORDER — RISPERIDONE 0.25 MG/1
TABLET, FILM COATED ORAL
COMMUNITY
Start: 2018-01-23
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
## 2018-02-08 NOTE — H&P
Per verbal order from Dr Veronica Morales bilateral hands redressed with silvadene, gauze and spandage. Pt instructed to continue BID washing with soap and water then applying silvadene, gauze and spandage. Verbalized understanding.

## 2018-03-27 ENCOUNTER — TELEPHONE (OUTPATIENT)
Dept: SURGERY | Facility: CLINIC | Age: 58
End: 2018-03-27

## 2018-03-27 NOTE — TELEPHONE ENCOUNTER
Esme Lewis RN from [facility] ([phone]) called regarding the pt stating that he wanted to relay information to us since the pt is seeing us Thursday, 3/29. The pt has been pulling off his dressings 2-3 times a day and picking at his wounds.   Antonio Hurd

## 2019-01-01 ENCOUNTER — HOSPITAL (OUTPATIENT)
Dept: OTHER | Age: 59
End: 2019-01-01
Attending: FAMILY MEDICINE

## 2019-01-01 LAB
ALBUMIN FLD-MCNC: 1.8 GM/DL
ALBUMIN SERPL-MCNC: 2.6 GM/DL (ref 3.6–5.1)
ALBUMIN SERPL-MCNC: 2.6 GM/DL (ref 3.6–5.1)
ALBUMIN/GLOB SERPL: 0.5 {RATIO} (ref 1–2.4)
ALBUMIN/GLOB SERPL: 0.5 {RATIO} (ref 1–2.4)
ALP SERPL-CCNC: 182 UNIT/L (ref 45–117)
ALP SERPL-CCNC: 186 UNIT/L (ref 45–117)
ALT SERPL-CCNC: 12 UNIT/L
ALT SERPL-CCNC: 15 UNIT/L
AMYLASE FLD-CCNC: 9 UNIT/L
ANA SER QL IA: NEGATIVE
ANALYZER ANC (IANC): ABNORMAL
ANALYZER ANC (IANC): ABNORMAL
ANION GAP SERPL CALC-SCNC: 15 MMOL/L (ref 10–20)
ANION GAP SERPL CALC-SCNC: 17 MMOL/L (ref 10–20)
APPEARANCE FLD: NORMAL
APPEARANCE FLD: NORMAL
AST SERPL-CCNC: 16 UNIT/L
AST SERPL-CCNC: 22 UNIT/L
BASE DEFICIT BLDA-SCNC: 12 MMOL/L (ref 0–2)
BASE DEFICIT BLDA-SCNC: 2 MMOL/L (ref 0–2)
BASE DEFICIT BLDA-SCNC: 7 MMOL/L (ref 0–2)
BASE DEFICIT BLDV-SCNC: 1 MMOL/L (ref 0–2)
BASE EXCESS BLDA CALC-SCNC: ABNORMAL MMOL/L
BASE EXCESS BLDV CALC-SCNC: ABNORMAL MMOL/L
BASOPHILS NFR BRONCH MANUAL: NORMAL %
BILIRUB SERPL-MCNC: 0.7 MG/DL (ref 0.2–1)
BILIRUB SERPL-MCNC: 1.3 MG/DL (ref 0.2–1)
BODY TEMPERATURE: 34.6 DEGREES
BODY TEMPERATURE: 37 DEGREES
BODY TEMPERATURE: 37 DEGREES
BUN SERPL-MCNC: 30 MG/DL (ref 6–20)
BUN SERPL-MCNC: 32 MG/DL (ref 6–20)
BUN/CREAT SERPL: 8 (ref 7–25)
BUN/CREAT SERPL: 8 (ref 7–25)
CA-I BLD ISE-SCNC: 1.13 MMOL/L (ref 1.15–1.29)
CALCIUM SERPL-MCNC: 9 MG/DL (ref 8.4–10.2)
CALCIUM SERPL-MCNC: 9.1 MG/DL (ref 8.4–10.2)
CHLORIDE: 96 MMOL/L (ref 98–107)
CHLORIDE: 99 MMOL/L (ref 98–107)
CHOLEST FLD-MCNC: <50 MG/DL
CK SERPL-CCNC: 75 UNIT/L (ref 39–308)
CO2 SERPL-SCNC: 23 MMOL/L (ref 21–32)
CO2 SERPL-SCNC: 26 MMOL/L (ref 21–32)
CREAT SERPL-MCNC: 3.92 MG/DL (ref 0.67–1.17)
CREAT SERPL-MCNC: 3.94 MG/DL (ref 0.67–1.17)
CREATININE, POC: 4 MG/DL (ref 0.67–1.17)
DIGOXIN SERPL-MCNC: 1.2 NG/ML (ref 0.8–2.1)
EOSINOPHIL NFR BRONCH MANUAL: NORMAL %
ERYTHROCYTE [DISTWIDTH] IN BLOOD: 18.6 % (ref 11–15)
ERYTHROCYTE [DISTWIDTH] IN BLOOD: 19.7 % (ref 11–15)
ETHANOL SERPL-MCNC: NORMAL MG/DL
GAS FLOW.O2 O2 DELIVERY SYS: ABNORMAL L/MIN
GLOBULIN SER-MCNC: 5.2 GM/DL (ref 2–4)
GLOBULIN SER-MCNC: 5.6 GM/DL (ref 2–4)
GLUCOSE BLDC GLUCOMTR-MCNC: 128 MG/DL (ref 65–99)
GLUCOSE FLD-MCNC: 121 MG/DL
GLUCOSE SERPL-MCNC: 143 MG/DL (ref 65–99)
GLUCOSE SERPL-MCNC: 90 MG/DL (ref 65–99)
HCO3 BLDA-SCNC: 12 MMOL/L (ref 22–28)
HCO3 BLDA-SCNC: 19 MMOL/L (ref 22–28)
HCO3 BLDA-SCNC: 23 MMOL/L (ref 22–28)
HCO3 BLDV-SCNC: 27 MMOL/L (ref 22–28)
HCT VFR BLD CALC: 40 % (ref 39–51)
HCT VFR FLD CALC: 1.6 %
HEMATOCRIT: 37.4 % (ref 39–51)
HEMATOCRIT: 41.2 % (ref 39–51)
HGB BLD-MCNC: 11 GM/DL (ref 13–17)
HGB BLD-MCNC: 12.9 GM/DL (ref 13–17)
INHALED O2 CONCENTRATION: 100 %
INHALED O2 CONCENTRATION: ABNORMAL %
INR PPP: 2.1
LACTATE BLDV-SCNC: 4.8 MMOL/L (ref 0–2)
LACTATE BLDV-SCNC: 5.6 MMOL/L (ref 0–2)
LDH FLD-CCNC: 561 UNIT/L
LYMPHOCYTES NFR BRONCH MANUAL: 16 %
MAGNESIUM SERPL-MCNC: 1.8 MG/DL (ref 1.7–2.4)
MAGNESIUM SERPL-MCNC: 2 MG/DL (ref 1.7–2.4)
MCH RBC QN AUTO: 29.5 PG (ref 26–34)
MCH RBC QN AUTO: 29.7 PG (ref 26–34)
MCHC RBC AUTO-ENTMCNC: 29.4 GM/DL (ref 32–36.5)
MCHC RBC AUTO-ENTMCNC: 31.3 GM/DL (ref 32–36.5)
MCV RBC AUTO: 101.1 FL (ref 78–100)
MCV RBC AUTO: 94.1 FL (ref 78–100)
MESOTHL CELL NFR FLD: 2 %
MONOS+MACROS NFR BRONCH MANUAL: 46 %
MYELOPEROXIDASE AB SER-ACNC: <0.2 AI (ref 0–0.9)
NEUTS SEG NFR FLD: 36 %
NRBC (NRBCRE): 0 /100 WBC
NRBC (NRBCRE): 1 /100 WBC
NUC CELL # FLD: 441 /MCL (ref 0–1000)
OXYHGB MFR BLDA: 92 % (ref 94–98)
OXYHGB MFR BLDA: 93 % (ref 94–98)
OXYHGB MFR BLDA: 96 % (ref 94–98)
PCO2 BLDA: 22 MM HG (ref 35–48)
PCO2 BLDA: 35 MM HG (ref 35–48)
PCO2 BLDA: 42 MM HG (ref 35–48)
PCO2 BLDV: 69 MM HG (ref 41–54)
PH BLDA: 7.33 UNIT (ref 7.35–7.45)
PH BLDA: 7.35 UNIT (ref 7.35–7.45)
PH BLDA: 7.36 UNIT (ref 7.35–7.45)
PH BLDV: 7.21 UNIT (ref 7.35–7.45)
PHOSPHATE SERPL-MCNC: 2.7 MG/DL (ref 2.4–4.7)
PHOSPHATE SERPL-MCNC: 2.9 MG/DL (ref 2.4–4.7)
PLATELET # BLD: 152 THOUSAND/MCL (ref 140–450)
PLATELET # BLD: 153 THOUSAND/MCL (ref 140–450)
PO2 BLDA: 189 MM HG (ref 83–108)
PO2 BLDA: 68 MM HG (ref 83–108)
PO2 BLDA: 79 MM HG (ref 83–108)
PO2 BLDV: 26 MM HG (ref 35–42)
POTASSIUM BLD-SCNC: 4.1 MMOL/L (ref 3.4–5.1)
POTASSIUM SERPL-SCNC: 3.6 MMOL/L (ref 3.4–5.1)
POTASSIUM SERPL-SCNC: 4.1 MMOL/L (ref 3.4–5.1)
PROT FLD-MCNC: 4.7 GM/DL
PROT SERPL-MCNC: 7.8 GM/DL (ref 6.4–8.2)
PROT SERPL-MCNC: 8.2 GM/DL (ref 6.4–8.2)
PROTEINASE3 AB SER-ACNC: <0.2 AI (ref 0–0.9)
PROTHROMBIN TIME: 21.3 SECONDS (ref 9.7–11.8)
PROTHROMBIN TIME: ABNORMAL
RBC # BLD: 3.7 MILLION/MCL (ref 4.5–5.9)
RBC # BLD: 4.38 MILLION/MCL (ref 4.5–5.9)
SAO2 % BLDA: 100 % (ref 95–99)
SAO2 % BLDA: 94 % (ref 95–99)
SAO2 % BLDA: 95 % (ref 95–99)
SAO2 % BLDV: 35 % (ref 60–80)
SERVICE CMNT-IMP: NORMAL
SODIUM BLD-SCNC: 136 MMOL/L (ref 135–145)
SODIUM SERPL-SCNC: 133 MMOL/L (ref 135–145)
SODIUM SERPL-SCNC: 135 MMOL/L (ref 135–145)
SPECIMEN SOURCE FLD: NORMAL
SPECIMEN SOURCE: NORMAL
SPECIMEN SOURCE: NORMAL
SPECIMEN VOL FLD: 620 ML
TROPONIN I SERPL HS-MCNC: <0.02 NG/ML
WBC # BLD: 13.5 THOUSAND/MCL (ref 4.2–11)
WBC # BLD: 6.1 THOUSAND/MCL (ref 4.2–11)

## 2019-01-11 LAB — PATH REPORT, CYTOLOGY: NORMAL

## 2019-03-18 NOTE — PLAN OF CARE
Called requested for notes and scans. Also informed pt. Stated she would try and get appointment for tomorrow   Pt found on floor of bathroom sitting upright on the floor. Pt assisted back to bed using rolling chair. Pt stated, \"it was impulse, once I started going I couldn't stop\". When asked if he hit his head, he stated, \"no\". Pt denies any pain at this time.

## 2021-11-23 NOTE — CONSULTS
History & Physical Examination    Patient Name: Edgar Soliman  MRN: [id number]  CSN: [id number]  YOB: 1960    Diagnosis: End stage renal disease with complication of dialysis access device    History Present Illness: Patient is a 60 year See me approximately yearly    Labs about every 6 months , inclding soon         CATH BARE METAL STENT (BMS)      Comment x2    AMPUTATION FINGER/THUMB Left     Comment thumb, 1st, and 2nd digits    AMPUTATION FINGER/THUMB Right     Comment index finger    CATARACT Bilateral     Comment at age 37     Family History   Problem Relation A

## (undated) DEVICE — DERMABOND LIQUID ADHESIVE

## (undated) DEVICE — SUCTION CANISTER, 3000CC,SAFELINER: Brand: DEROYAL

## (undated) DEVICE — COVER SGL STRL LGHT HNDL BLU

## (undated) DEVICE — POUCH: SSEAL TYVEK 2000/CS: Brand: MEDICAL ACTION INDUSTRIES

## (undated) DEVICE — 6 ML SYRINGE LUER-LOCK TIP: Brand: MONOJECT

## (undated) DEVICE — 3M™ TEGADERM™ TRANSPARENT FILM DRESSING, 1626W, 4 IN X 4-3/4 IN (10 CM X 12 CM), 50 EACH/CARTON, 4 CARTON/CASE: Brand: 3M™ TEGADERM™

## (undated) DEVICE — DRAPE,EXTREMITY,89X128,STERILE: Brand: MEDLINE

## (undated) DEVICE — TRAY SRGPRP PVP IOD WT SCRB SM

## (undated) DEVICE — X-RAY DETECTABLE SPONGES,16 PLY: Brand: VISTEC

## (undated) DEVICE — CV: Brand: MEDLINE INDUSTRIES, INC.

## (undated) DEVICE — CLIPPER BLADE 3M

## (undated) DEVICE — SOL  .9 1000ML BTL

## (undated) DEVICE — REM POLYHESIVE ADULT PATIENT RETURN ELECTRODE: Brand: VALLEYLAB

## (undated) DEVICE — INDICATED FOR SURGICAL CLAMPING DURING CARDIOVASCULAR PERIPHERAL VASCULAR, AND GENERAL SURGERY.: Brand: SOFT/FIBRA® SPRING CLIP

## (undated) DEVICE — SUTURE SILK 2-0

## (undated) DEVICE — SUTURE PROLENE 6-0 C-1

## (undated) DEVICE — GOWN SURG AERO BLUE PERF XLG

## (undated) DEVICE — STERILE POLYISOPRENE POWDER-FREE SURGICAL GLOVES: Brand: PROTEXIS

## (undated) DEVICE — DECANTER SPIKE TRANSFLOW

## (undated) DEVICE — SUCTION SARNS MICRO SUCKER

## (undated) DEVICE — SUTURE PROLENE 6-0 BV-1

## (undated) DEVICE — SUTURE SILK 4-0 SA63H

## (undated) DEVICE — SUTURE ETHILON 3-0 669H

## (undated) DEVICE — KENDALL SCD EXPRESS SLEEVES, KNEE LENGTH, MEDIUM: Brand: KENDALL SCD

## (undated) DEVICE — Device: Brand: JELCO

## (undated) DEVICE — CLIP SM INTNL HMCLP TNTLM ESCP

## (undated) DEVICE — CATH RED RUBBER 14FR

## (undated) DEVICE — SOL  .9 1000ML BAG

## (undated) DEVICE — TOWEL OR BLU 16X26 STRL

## (undated) DEVICE — STERILE TETRA-FLEX CF, ELASTIC BANDAGE, 4" X 5.5YD: Brand: TETRA-FLEX™CF

## (undated) DEVICE — BANDAGE ROLL,100% COTTON, 6 PLY, LARGE: Brand: KERLIX

## (undated) DEVICE — SUTURE PROLENE 7-0 BV-1

## (undated) DEVICE — DECANTER VIAL FLOW DISPENSER

## (undated) DEVICE — NEEDLE HPO 18GA 1.5IN ECLPS

## (undated) DEVICE — SUTURE VICRYL 3-0 SH

## (undated) DEVICE — CLIP SM W INTNL HRZN TI TPE LF

## (undated) NOTE — MR AVS SNAPSHOT
[address], [address] [place] [id number] [phone]               Thank you for choosing us for your health care visit with Angel Councilman, MD.  We are glad to serve you and happy to provide you with this summary Commonly known as:  LEXAPRO           FREESTYLE LANCETS Misc           FREESTYLE LITE TEST Strp   Generic drug:  Glucose Blood           HYDROcodone-acetaminophen  MG Tabs   Take 1 tablet by mouth every 8 (eight) hours as needed.    Commonly known as: If you have questions, you can call [phone] to talk to our [facility] Staff. Remember, emerehart is NOT to be used for urgent needs. For medical emergencies, dial 911.            Visit [facility] online at  [email or web address]

## (undated) NOTE — LETTER
Julio Cesar Ortiz [address], [place], [place]  [address]  INFORMED CONSENT FOR TRANSFUSION OF BLOOD OR BLOOD PRODUCTS   My physician has informed me of the nature, purpose, benefits and risks of transfusion for blood and blood components lara (Signature of Patient)                                                           (Responsible party in case of Minor,                                                                                                Incompetent, or unconscious Patient)  _____

## (undated) NOTE — LETTER
[address], [place]  Authorization for Invasive Procedures  1.  I hereby authorize Dr. Mohsen Giron , my physician and whomever may be designated as the doctor's assistant, to perform the following operation and/or procedure:  *Left fam performed for the purposes of advancing medicine, science, and/or education, provided my identity is not revealed. If the procedure has been videotaped, the physician/surgeon will obtain the original videotape.  The hospital will not be responsible for stor My signature below affirms that prior to the time of the procedure, I have explained to the patient and/or his legal representative, the risks and benefits involved in the proposed treatment and any reasonable alternative to the proposed treatment.  I have

## (undated) NOTE — LETTER
Date & Time: 7/29/2017, 7:44 AM  Patient: Mainor Trejo  Attending Provider: Hansel Lorenz MD      This certifies that Raissa Saucedo, a patient at an [facility] facility, am leaving the facility voluntarily and against

## (undated) NOTE — IP AVS SNAPSHOT
[address]  [address] [facility] Megan ~ [phone]                Discharge Summary   3/25/2017    Sierra French           Admission Information        Provider Department    3/25/2017 Erna Guerrero MD E CONTINUE taking these medications        Instructions Authorizing Provider    Morning Afternoon Evening As Needed    aspirin 81 MG Tabs        Take 81 mg by mouth daily.                             Atorvastatin Calcium 10 MG Tabs   Last time this was given: Last time this was given:  100 mg on 3/26/2017  8:57 PM        Take  by mouth.                             temazepam 30 MG Caps   Last time this was given:  30 mg on 3/26/2017 10:05 PM   Commonly known as:  [employer] Ruffin tools/applicances (power saws, electric knives or mixers)   · That you have someone stay with you on your first night home   · Do not drink alcohol or take sleeping pills or tranquilizers   · Do not sign legal documents within 24 hours of your procedure bath. Resume  Regular diet. Call surgeon if pain becomes severe or unable to use extremity. Marcelina Walker MD   [facility] [address]  [id number]  [facility]  [phone]    Please call my office for a follow-up appointment and wound check.  [place] [facility]abolic [facility] Results  (Last result in the past 90 days)    HgbA1C Glucose BUN Creatinine Calcium Alkaline Phosph AST    (03/25/17)  6.6 (H) (03/27/17)  129 (H) (03/27/17)  76 (H) (03/27/17)  8.52 (H) (03/27/17)  9.2 -- --      Metabolic Lab Results  (Last your Zip Code and Date of Birth to complete the sign-up process. If you do not sign up before the expiration date, you must request a new code.     Your unique [employer] Access Code: [email or web address]  Expires: 5/26/2017  7:08 AM    If you have questions, you can c Most common side effects: Abnormal bleeding   What to report to your healthcare team: Bruising, blood in urine or stool, or nosebleeds             Salicylates     Salicylates    aspirin 81 MG Oral Tab         Use:  “Thinning” of blood to prevent clotting w escitalopram 5 MG Oral Tab       Use: Treat conditions such as depression and thought disorders   Most common side effects: Dizziness, drowsiness, problems with movement   What to report to your healthcare team: Changes in thinking, talking or movement, d